# Patient Record
Sex: FEMALE | Race: WHITE | NOT HISPANIC OR LATINO | URBAN - METROPOLITAN AREA
[De-identification: names, ages, dates, MRNs, and addresses within clinical notes are randomized per-mention and may not be internally consistent; named-entity substitution may affect disease eponyms.]

---

## 2020-04-06 ENCOUNTER — TELEPHONE (OUTPATIENT)
Dept: FAMILY MEDICINE CLINIC | Facility: CLINIC | Age: 30
End: 2020-04-06

## 2020-04-06 ENCOUNTER — TELEMEDICINE (OUTPATIENT)
Dept: FAMILY MEDICINE CLINIC | Facility: CLINIC | Age: 30
End: 2020-04-06

## 2020-04-06 DIAGNOSIS — J45.41 MODERATE PERSISTENT ASTHMA WITH ACUTE EXACERBATION: Primary | ICD-10-CM

## 2020-04-06 DIAGNOSIS — J45.41 MODERATE PERSISTENT ASTHMA WITH ACUTE EXACERBATION: ICD-10-CM

## 2020-04-06 PROCEDURE — 99202 OFFICE O/P NEW SF 15 MIN: CPT | Performed by: NURSE PRACTITIONER

## 2020-04-06 RX ORDER — ALBUTEROL SULFATE 90 UG/1
2 AEROSOL, METERED RESPIRATORY (INHALATION) 4 TIMES DAILY PRN
COMMUNITY
Start: 2020-04-01 | End: 2020-04-06 | Stop reason: SDUPTHER

## 2020-04-06 RX ORDER — BUDESONIDE AND FORMOTEROL FUMARATE DIHYDRATE 160; 4.5 UG/1; UG/1
2 AEROSOL RESPIRATORY (INHALATION)
Qty: 1 INHALER | Refills: 12 | Status: SHIPPED | OUTPATIENT
Start: 2020-04-06 | End: 2020-04-06 | Stop reason: SDUPTHER

## 2020-04-06 RX ORDER — ALBUTEROL SULFATE 2.5 MG/3ML
2.5 SOLUTION RESPIRATORY (INHALATION) 4 TIMES DAILY PRN
COMMUNITY
Start: 2020-04-01 | End: 2021-12-23 | Stop reason: SDUPTHER

## 2020-04-06 RX ORDER — PREDNISONE 10 MG/1
10 TABLET ORAL 2 TIMES DAILY
COMMUNITY
Start: 2020-04-01 | End: 2020-04-06

## 2020-04-06 RX ORDER — ALBUTEROL SULFATE 90 UG/1
2 AEROSOL, METERED RESPIRATORY (INHALATION) 4 TIMES DAILY PRN
Qty: 1 INHALER | Refills: 11 | Status: SHIPPED | OUTPATIENT
Start: 2020-04-06 | End: 2022-01-31 | Stop reason: SDUPTHER

## 2020-04-06 RX ORDER — ALBUTEROL SULFATE 90 UG/1
2 AEROSOL, METERED RESPIRATORY (INHALATION) 4 TIMES DAILY PRN
Qty: 1 INHALER | Refills: 11 | Status: SHIPPED | OUTPATIENT
Start: 2020-04-06 | End: 2020-04-06 | Stop reason: SDUPTHER

## 2020-04-06 RX ORDER — BUDESONIDE AND FORMOTEROL FUMARATE DIHYDRATE 160; 4.5 UG/1; UG/1
2 AEROSOL RESPIRATORY (INHALATION)
Qty: 1 INHALER | Refills: 12 | Status: SHIPPED | OUTPATIENT
Start: 2020-04-06 | End: 2021-03-28

## 2020-04-06 RX ORDER — CETIRIZINE HYDROCHLORIDE 5 MG/1
5 TABLET ORAL DAILY
COMMUNITY

## 2020-04-06 NOTE — PROGRESS NOTES
"Subjective   Jeanine Monteiro is a 29 y.o. female. She is new patient and new to me.     History of Present Illness   Pt presents today with c/o asthma, chronic, persistent, uncontrolled. Pt reports that she went to an immediate care center recently due to cough and shortness of breath. She states that she was negative for COVID-19. Pt was treated with prednisone and albuterol inhaler and nebulizer. Pt reports that she \"wakes up coughing and wheezing every morning\". She states that she is using her albuterol \"at least 3 times a day\" due to wheezing and shortness of breath. Pt reports that she moved into a new apartment and that she had to have the carpet replaced, which \"has helped some\" with her breathing.  Pt states that she has no job right now due to being \"laid off\". She is a  by [employer] She is taking Zyrtec and Flonase over the counter, in addition to her albuterol.  I discussed at length maintenance inhalers and potential adverse side affects.  Pt is in agreement with starting this, however, is concerned with the potential cost of the medication.     The following portions of the patient's history were reviewed and updated as appropriate: allergies, current medications, past family history, past medical history, past social history, past surgical history and problem list.    Review of Systems   Constitutional: Positive for unexpected weight gain. Negative for appetite change, chills, fatigue and fever.   HENT: Positive for rhinorrhea. Negative for congestion, ear pain, postnasal drip, sinus pressure, sneezing and sore throat.    Eyes: Positive for itching. Negative for redness.   Respiratory: Positive for cough, shortness of breath and wheezing. Negative for chest tightness.    Cardiovascular: Negative for chest pain, palpitations and leg swelling.   Musculoskeletal: Negative for myalgias.   Allergic/Immunologic: Negative.    Neurological: Positive for headache. Negative for dizziness.       Objective "   Physical Exam     This vist was completed via televisit due to the restrictions of the COVID-19 pandemic. All issues as below were discussed and addressed. If it was felt that the patient should be evaluated in clinic, then they were directed there.  The patient verbally consented to visit.     There were no vitals filed for this visit.  There is no height or weight on file to calculate BMI.    Procedures    Assessment/Plan   Problems Addressed this Visit     None      Visit Diagnoses     Moderate persistent asthma with acute exacerbation    -  Primary    Relevant Medications    albuterol (PROVENTIL) (2.5 MG/3ML) 0.083% nebulizer solution        Return in about 4 weeks (around 5/4/2020) for Recheck, Labs.              I have sent you a prescription for Symbicort 2 puffs, twice daily. This is a maintenance medication as discussed and does not take the place of your albuterol (emergency) inhaler.  I would like to see you in 1 month to recheck how you are doing with the new medication.  Call with any questions or concerns.   I encourage you to sign up for [employer] as you can contact me directly.     Asthma, Adult    Asthma is a long-term (chronic) condition that causes recurrent episodes in which the airways become tight and narrow. The airways are the passages that lead from the nose and mouth down into the lungs. Asthma episodes, also called asthma attacks, can cause coughing, wheezing, shortness of breath, and chest pain. The airways can also fill with mucus. During an attack, it can be difficult to breathe. Asthma attacks can range from minor to life threatening.  Asthma cannot be cured, but medicines and lifestyle changes can help control it and treat acute attacks.  What are the causes?  This condition is believed to be caused by inherited (genetic) and environmental factors, but its exact cause is not known.  There are many things that can bring on an asthma attack or make asthma symptoms worse (triggers).  Asthma triggers are different for each person. Common triggers include:  · Mold.  · Dust.  · Cigarette smoke.  · Cockroaches.  · Things that can cause allergy symptoms (allergens), such as animal dander or pollen from trees or grass.  · Air pollutants such as household , wood smoke, smog, or chemical odors.  · Cold air, weather changes, and winds (which increase molds and pollen in the air).  · Strong emotional expressions such as crying or laughing hard.  · Stress.  · Certain medicines (such as aspirin) or types of medicines (such as beta-blockers).  · Sulfites in foods and drinks. Foods and drinks that may contain sulfites include dried fruit, potato chips, and sparkling grape juice.  · Infections or inflammatory conditions such as the flu, a cold, or inflammation of the nasal membranes (rhinitis).  · Gastroesophageal reflux disease (GERD).  · Exercise or strenuous activity.  What are the signs or symptoms?  Symptoms of this condition may occur right after asthma is triggered or many hours later. Symptoms include:  · Wheezing. This can sound like whistling when you breathe.  · Excessive nighttime or early morning coughing.  · Frequent or severe coughing with a common cold.  · Chest tightness.  · Shortness of breath.  · Tiredness (fatigue) with minimal activity.  How is this diagnosed?  This condition is diagnosed based on:  · Your medical history.  · A physical exam.  · Tests, which may include:  ? Lung function studies and pulmonary studies (spirometry). These tests can evaluate the flow of air in your lungs.  ? Allergy tests.  ? Imaging tests, such as X-rays.  How is this treated?  There is no cure for this condition, but treatment can help control your symptoms. Treatment for asthma usually involves:  · Identifying and avoiding your asthma triggers.  · Using medicines to control your symptoms. Generally, two types of medicines are used to treat asthma:  ? Controller medicines. These help prevent asthma  symptoms from occurring. They are usually taken every day.  ? Fast-acting reliever or rescue medicines. These quickly relieve asthma symptoms by widening the narrow and tight airways. They are used as needed and provide short-term relief.  · Using supplemental oxygen. This may be needed during a severe episode.  · Using other medicines, such as:  ? Allergy medicines, such as antihistamines, if your asthma attacks are triggered by allergens.  ? Immune medicines (immunomodulators). These are medicines that help control the immune system.  · Creating an asthma action plan. An asthma action plan is a written plan for managing and treating your asthma attacks. This plan includes:  ? A list of your asthma triggers and how to avoid them.  ? Information about when medicines should be taken and when their dosage should be changed.  ? Instructions about using a device called a peak flow meter. A peak flow meter measures how well the lungs are working and the severity of your asthma. It helps you monitor your condition.  Follow these instructions at home:  Controlling your home environment  Control your home environment in the following ways to help avoid triggers and prevent asthma attacks:  · Change your heating and air conditioning filter regularly.  · Limit your use of fireplaces and wood stoves.  · Get rid of pests (such as roaches and mice) and their droppings.  · Throw away plants if you see mold on them.  · Clean floors and dust surfaces regularly. Use unscented cleaning products.  · Try to have someone else vacuum for you regularly. Stay out of rooms while they are being vacuumed and for a short while afterward. If you vacuum, use a dust mask from a hardware store, a double-layered or microfilter vacuum  bag, or a vacuum  with a HEPA filter.  · Replace carpet with wood, tile, or vinyl jackelin. Carpet can trap dander and dust.  · Use allergy-proof pillows, mattress covers, and box spring covers.  · Keep  your bedroom a trigger-free room.  · Avoid pets and keep windows closed when allergens are in the air.  · Wash beddings every week in hot water and dry them in a dryer.  · Use blankets that are made of polyester or cotton.  · Clean bathrooms and cris with bleach. If possible, have someone repaint the walls in these rooms with mold-resistant paint. Stay out of the rooms that are being cleaned and painted.  · Wash your hands often with soap and water. If soap and water are not available, use hand .  · Do not allow anyone to smoke in your home.  General instructions  · Take over-the-counter and prescription medicines only as told by your health care provider.  ? Speak with your health care provider if you have questions about how or when to take the medicines.  ? Make note if you are requiring more frequent dosages.  · Do not use any products that contain nicotine or tobacco, such as cigarettes and e-cigarettes. If you need help quitting, ask your health care provider. Also, avoid being exposed to secondhand smoke.  · Use a peak flow meter as told by your health care provider. Record and keep track of the readings.  · Understand and use the asthma action plan to help minimize, or stop an asthma attack, without needing to seek medical care.  · Make sure you stay up to date on your yearly vaccinations as told by your health care provider. This may include vaccines for the flu and pneumonia.  · Avoid outdoor activities when allergen counts are high and when air quality is low.  · Wear a ski mask that covers your nose and mouth during outdoor winter activities. Exercise indoors on cold days if you can.  · Warm up before exercising, and take time for a cool-down period after exercise.  · Keep all follow-up visits as told by your health care provider. This is important.  Where to find more information  · For information about asthma, turn to the Centers for Disease Control and Prevention at  www.cdc.gov/asthma/faqs.htm  · For air quality information, turn to [employer] at https://airnow.gov/  Contact a health care provider if:  · You have wheezing, shortness of breath, or a cough even while you are taking medicine to prevent attacks.  · The mucus you cough up (sputum) is thicker than usual.  · Your sputum changes from clear or white to yellow, green, gray, or bloody.  · Your medicines are causing side effects, such as a rash, itching, swelling, or trouble breathing.  · You need to use a reliever medicine more than 2-3 times a week.  · Your peak flow reading is still at 50-79% of your personal best after following your action plan for 1 hour.  · You have a fever.  Get help right away if:  · You are getting worse and do not respond to treatment during an asthma attack.  · You are short of breath when at rest or when doing very little physical activity.  · You have difficulty eating, drinking, or talking.  · You have chest pain or tightness.  · You develop a fast heartbeat or palpitations.  · You have a bluish color to your lips or fingernails.  · You are light-headed or dizzy, or you faint.  · Your peak flow reading is less than 50% of your personal best.  · You feel too tired to breathe normally.  Summary  · Asthma is a long-term (chronic) condition that causes recurrent episodes in which the airways become tight and narrow. These episodes can cause coughing, wheezing, shortness of breath, and chest pain.  · Asthma cannot be cured, but medicines and lifestyle changes can help control it and treat acute attacks.  · Make sure you understand how to avoid triggers and how and when to use your medicines.  · Asthma attacks can range from minor to life threatening. Get help right away if you have an asthma attack and do not respond to treatment with your usual rescue medicines.  This information is not intended to replace advice given to you by your health care provider. Make sure you discuss any questions you have  with your health care provider.  Document Released: 12/18/2006 Document Revised: 01/22/2018 Document Reviewed: 01/22/2018  Elsevier Interactive Patient Education © 2020 Elsevier Inc.

## 2020-04-06 NOTE — TELEPHONE ENCOUNTER
PATIENT STATES THAT SHE WOULD LIKE TO CHANGE HER PHARMACY TO [id number] - [place], [place] - [address] AT [facility] [address] &  - [phone]  - [phone] FX. SHE STATES THAT SHE TOLD HER DOCTOR TO SEND HER MEDS TO [facility] BUT SHE NO LONGER WANTS THAT TO HAPPEN.     PATIENT CAN BE REACHED AT:[phone]    LOV-4/6/2020

## 2020-04-06 NOTE — PATIENT INSTRUCTIONS
I have sent you a prescription for Symbicort 2 puffs, twice daily. This is a maintenance medication as discussed and does not take the place of your albuterol (emergency) inhaler.  I would like to see you in 1 month to recheck how you are doing with the new medication.  Call with any questions or concerns.   I encourage you to sign up for [employer] as you can contact me directly.     Asthma, Adult    Asthma is a long-term (chronic) condition that causes recurrent episodes in which the airways become tight and narrow. The airways are the passages that lead from the nose and mouth down into the lungs. Asthma episodes, also called asthma attacks, can cause coughing, wheezing, shortness of breath, and chest pain. The airways can also fill with mucus. During an attack, it can be difficult to breathe. Asthma attacks can range from minor to life threatening.  Asthma cannot be cured, but medicines and lifestyle changes can help control it and treat acute attacks.  What are the causes?  This condition is believed to be caused by inherited (genetic) and environmental factors, but its exact cause is not known.  There are many things that can bring on an asthma attack or make asthma symptoms worse (triggers). Asthma triggers are different for each person. Common triggers include:  · Mold.  · Dust.  · Cigarette smoke.  · Cockroaches.  · Things that can cause allergy symptoms (allergens), such as animal dander or pollen from trees or grass.  · Air pollutants such as household , wood smoke, smog, or chemical odors.  · Cold air, weather changes, and winds (which increase molds and pollen in the air).  · Strong emotional expressions such as crying or laughing hard.  · Stress.  · Certain medicines (such as aspirin) or types of medicines (such as beta-blockers).  · Sulfites in foods and drinks. Foods and drinks that may contain sulfites include dried fruit, potato chips, and sparkling grape juice.  · Infections or inflammatory  conditions such as the flu, a cold, or inflammation of the nasal membranes (rhinitis).  · Gastroesophageal reflux disease (GERD).  · Exercise or strenuous activity.  What are the signs or symptoms?  Symptoms of this condition may occur right after asthma is triggered or many hours later. Symptoms include:  · Wheezing. This can sound like whistling when you breathe.  · Excessive nighttime or early morning coughing.  · Frequent or severe coughing with a common cold.  · Chest tightness.  · Shortness of breath.  · Tiredness (fatigue) with minimal activity.  How is this diagnosed?  This condition is diagnosed based on:  · Your medical history.  · A physical exam.  · Tests, which may include:  ? Lung function studies and pulmonary studies (spirometry). These tests can evaluate the flow of air in your lungs.  ? Allergy tests.  ? Imaging tests, such as X-rays.  How is this treated?  There is no cure for this condition, but treatment can help control your symptoms. Treatment for asthma usually involves:  · Identifying and avoiding your asthma triggers.  · Using medicines to control your symptoms. Generally, two types of medicines are used to treat asthma:  ? Controller medicines. These help prevent asthma symptoms from occurring. They are usually taken every day.  ? Fast-acting reliever or rescue medicines. These quickly relieve asthma symptoms by widening the narrow and tight airways. They are used as needed and provide short-term relief.  · Using supplemental oxygen. This may be needed during a severe episode.  · Using other medicines, such as:  ? Allergy medicines, such as antihistamines, if your asthma attacks are triggered by allergens.  ? Immune medicines (immunomodulators). These are medicines that help control the immune system.  · Creating an asthma action plan. An asthma action plan is a written plan for managing and treating your asthma attacks. This plan includes:  ? A list of your asthma triggers and how to  avoid them.  ? Information about when medicines should be taken and when their dosage should be changed.  ? Instructions about using a device called a peak flow meter. A peak flow meter measures how well the lungs are working and the severity of your asthma. It helps you monitor your condition.  Follow these instructions at home:  Controlling your home environment  Control your home environment in the following ways to help avoid triggers and prevent asthma attacks:  · Change your heating and air conditioning filter regularly.  · Limit your use of fireplaces and wood stoves.  · Get rid of pests (such as roaches and mice) and their droppings.  · Throw away plants if you see mold on them.  · Clean floors and dust surfaces regularly. Use unscented cleaning products.  · Try to have someone else vacuum for you regularly. Stay out of rooms while they are being vacuumed and for a short while afterward. If you vacuum, use a dust mask from a [employer] store, a double-layered or microfilter vacuum  bag, or a vacuum  with a HEPA filter.  · Replace carpet with wood, tile, or vinyl jackelin. Carpet can trap dander and dust.  · Use allergy-proof pillows, mattress covers, and box spring covers.  · Keep your bedroom a trigger-free room.  · Avoid pets and keep windows closed when allergens are in the air.  · Wash beddings every week in hot water and dry them in a dryer.  · Use blankets that are made of polyester or cotton.  · Clean bathrooms and cris with bleach. If possible, have someone repaint the walls in these rooms with mold-resistant paint. Stay out of the rooms that are being cleaned and painted.  · Wash your hands often with soap and water. If soap and water are not available, use hand .  · Do not allow anyone to smoke in your home.  General instructions  · Take over-the-counter and prescription medicines only as told by your health care provider.  ? Speak with your health care provider if you have  questions about how or when to take the medicines.  ? Make note if you are requiring more frequent dosages.  · Do not use any products that contain nicotine or tobacco, such as cigarettes and e-cigarettes. If you need help quitting, ask your health care provider. Also, avoid being exposed to secondhand smoke.  · Use a peak flow meter as told by your health care provider. Record and keep track of the readings.  · Understand and use the asthma action plan to help minimize, or stop an asthma attack, without needing to seek medical care.  · Make sure you stay up to date on your yearly vaccinations as told by your health care provider. This may include vaccines for the flu and pneumonia.  · Avoid outdoor activities when allergen counts are high and when air quality is low.  · Wear a ski mask that covers your nose and mouth during outdoor winter activities. Exercise indoors on cold days if you can.  · Warm up before exercising, and take time for a cool-down period after exercise.  · Keep all follow-up visits as told by your health care provider. This is important.  Where to find more information  · For information about asthma, turn to the Centers for Disease Control and Prevention at www.cdc.gov/asthma/faqs.htm  · For air quality information, turn to [employer] at https://airnow.gov/  Contact a health care provider if:  · You have wheezing, shortness of breath, or a cough even while you are taking medicine to prevent attacks.  · The mucus you cough up (sputum) is thicker than usual.  · Your sputum changes from clear or white to yellow, green, gray, or bloody.  · Your medicines are causing side effects, such as a rash, itching, swelling, or trouble breathing.  · You need to use a reliever medicine more than 2-3 times a week.  · Your peak flow reading is still at 50-79% of your personal best after following your action plan for 1 hour.  · You have a fever.  Get help right away if:  · You are getting worse and do not respond to  treatment during an asthma attack.  · You are short of breath when at rest or when doing very little physical activity.  · You have difficulty eating, drinking, or talking.  · You have chest pain or tightness.  · You develop a fast heartbeat or palpitations.  · You have a bluish color to your lips or fingernails.  · You are light-headed or dizzy, or you faint.  · Your peak flow reading is less than 50% of your personal best.  · You feel too tired to breathe normally.  Summary  · Asthma is a long-term (chronic) condition that causes recurrent episodes in which the airways become tight and narrow. These episodes can cause coughing, wheezing, shortness of breath, and chest pain.  · Asthma cannot be cured, but medicines and lifestyle changes can help control it and treat acute attacks.  · Make sure you understand how to avoid triggers and how and when to use your medicines.  · Asthma attacks can range from minor to life threatening. Get help right away if you have an asthma attack and do not respond to treatment with your usual rescue medicines.  This information is not intended to replace advice given to you by your health care provider. Make sure you discuss any questions you have with your health care provider.  Document Released: 12/18/2006 Document Revised: 01/22/2018 Document Reviewed: 01/22/2018  [employer] Interactive Patient Education © 2020 Elsevier Inc.

## 2020-04-16 ENCOUNTER — TELEPHONE (OUTPATIENT)
Dept: FAMILY MEDICINE CLINIC | Facility: CLINIC | Age: 30
End: 2020-04-16

## 2020-04-16 NOTE — TELEPHONE ENCOUNTER
PT STATED THAT HER INSURANCE WILL NOT PAY FOR THE MEDICATION budesonide-formoterol (Symbicort) 160-4.5 MCG/ACT inhaler WITHOUT TO[employer]O FIGU[employer] TALKING TO THEM. PT REQUESTED TO GET A PHONE CALL ON THIS MATTER.

## 2020-04-17 DIAGNOSIS — J45.41 MODERATE PERSISTENT ASTHMA WITH ACUTE EXACERBATION: ICD-10-CM

## 2020-04-17 RX ORDER — BUDESONIDE AND FORMOTEROL FUMARATE DIHYDRATE 160; 4.5 UG/1; UG/1
AEROSOL RESPIRATORY (INHALATION)
Qty: 10.2 G | OUTPATIENT
Start: 2020-04-17

## 2021-02-01 ENCOUNTER — TELEPHONE (OUTPATIENT)
Dept: FAMILY MEDICINE CLINIC | Facility: CLINIC | Age: 31
End: 2021-02-01

## 2021-02-01 ENCOUNTER — TELEPHONE (OUTPATIENT)
Dept: INTERNAL MEDICINE | Facility: CLINIC | Age: 31
End: 2021-02-01

## 2021-02-01 RX ORDER — BUSPIRONE HYDROCHLORIDE 15 MG/1
15 TABLET ORAL 2 TIMES DAILY
Qty: 180 TABLET | Refills: 1 | Status: SHIPPED | OUTPATIENT
Start: 2021-02-01 | End: 2022-01-31 | Stop reason: SDUPTHER

## 2021-02-01 NOTE — TELEPHONE ENCOUNTER
Caller: Jeanine Monteiro    Relationship to patient: Self    Best call back number: [phone]     Patient is needing: Patient called in requesting a refill on the buspirone (did not see on medication list). Was unsure if patient needed an appointment to have medication refilled or not. Please advise.

## 2021-08-04 ENCOUNTER — OFFICE VISIT (OUTPATIENT)
Dept: INTERNAL MEDICINE | Facility: CLINIC | Age: 31
End: 2021-08-04

## 2021-08-04 VITALS
BODY MASS INDEX: 35.88 KG/M2 | HEART RATE: 100 BPM | OXYGEN SATURATION: 98 % | WEIGHT: 195 LBS | RESPIRATION RATE: 18 BRPM | TEMPERATURE: 98 F | DIASTOLIC BLOOD PRESSURE: 76 MMHG | HEIGHT: 62 IN | SYSTOLIC BLOOD PRESSURE: 124 MMHG

## 2021-08-04 DIAGNOSIS — N64.4 BREAST PAIN: ICD-10-CM

## 2021-08-04 DIAGNOSIS — R07.9 CHEST PAIN, UNSPECIFIED TYPE: Primary | ICD-10-CM

## 2021-08-04 PROCEDURE — 99214 OFFICE O/P EST MOD 30 MIN: CPT | Performed by: INTERNAL MEDICINE

## 2021-08-04 PROCEDURE — 93005 ELECTROCARDIOGRAM TRACING: CPT | Performed by: INTERNAL MEDICINE

## 2021-08-04 RX ORDER — MONTELUKAST SODIUM 10 MG/1
TABLET ORAL
COMMUNITY
Start: 2021-07-27 | End: 2021-10-25

## 2021-08-04 RX ORDER — MELOXICAM 15 MG/1
15 TABLET ORAL DAILY
Qty: 30 TABLET | Refills: 2 | Status: SHIPPED | OUTPATIENT
Start: 2021-08-04 | End: 2021-10-25

## 2021-08-04 NOTE — PROGRESS NOTES
"Chief Complaint  Breast Mass (left )    Subjective          Jeanine Monteiro presents to [facility]  Here with left breast pain for last 6 weeks or so, no specific injury, left upper breast, worse with lifting and carrying items, does a lot of lifting over her head with trays at work; no redness, has felt it being warm in the past; has noted that his pain is worse with menstrual cycles; did recently try to have an in home insemination prior to this starting, did not take any medications; does note that the left breast has always been larger than the right      Objective   Vital Signs:   /76   Pulse 100   Temp 98 °F (36.7 °C)   Resp 18   Ht 157.5 cm (62\")   Wt 88.5 kg (195 lb)   SpO2 98%   BMI 35.67 kg/m²     Physical Exam  Vitals and nursing note reviewed.   Constitutional:       Appearance: Normal appearance.   HENT:      Head: Normocephalic and atraumatic.      Right Ear: External ear normal.      Left Ear: External ear normal.      Nose: Nose normal.      Mouth/Throat:      Mouth: Mucous membranes are moist.      Pharynx: Oropharynx is clear.   Eyes:      Extraocular Movements: Extraocular movements intact.      Conjunctiva/sclera: Conjunctivae normal.   Pulmonary:      Effort: Pulmonary effort is normal. No respiratory distress.   Musculoskeletal:         General: Normal range of motion.      Cervical back: Normal range of motion.      Comments: Breast and axillary exams normal bilaterally, supervised by richard paige, left upper pectoral muscle ttp   Skin:     Findings: No rash.   Neurological:      General: No focal deficit present.      Mental Status: She is alert. Mental status is at baseline.   Psychiatric:         Mood and Affect: Mood normal.         Behavior: Behavior normal.         Thought Content: Thought content normal.         Judgment: Judgment normal.        Result Review :            ECG 12 Lead    Date/Time: 11/2/2021 2:56 " PM  Performed by: Arturo Mullins MD  Authorized by: Arturo Mullins MD   Comparison: not compared with previous ECG   Previous ECG: no previous ECG available  Rhythm: sinus rhythm  Rate: normal  Conduction: conduction normal  ST Segments: ST segments normal  T Waves: T waves normal  QRS axis: normal    Clinical impression: normal ECG              Assessment and Plan    Diagnoses and all orders for this visit:    1. Chest pain, unspecified type (Primary)  -     ECG 12 Lead    2. Breast pain  -     Mammo Screening Bilateral With CAD; Future  -     POCT pregnancy, urine    Other orders  -     meloxicam (MOBIC) 15 MG tablet; Take 1 tablet by mouth Daily.  Dispense: 30 tablet; Refill: 2    - suspect her breast/chest wall pain is 2/2 suspensory ligament pain vs muscular strain; the pain seems to be worse with breast size changing with menstrual cycles; start nsaids, pt referral given today to help with strengthening, stretching exercises  - mammogram placed today, feel less likely mass to be a concern, none on exam  - rtc 1 month    Follow Up   No follow-ups on file.  Patient was given instructions and counseling regarding her condition or for health maintenance advice. Please see specific information pulled into the AVS if appropriate.

## 2021-09-27 NOTE — TELEPHONE ENCOUNTER
Hub staff attempted to follow warm transfer process and was unsuccessful     Caller: Jeanine Monteiro    Relationship to patient: Self    Best call back number: [phone] (H)    Patient is needing: PATIENT CALLING TO CHECK THE STATUS OF MEDICATION REFILL PATIENT STATES COMPLETELY OUT      Breo Ellipta 100-25 MCG/INH inhaler      [facility] #[id number] [facility] [address] AT [facility] - [phone] [facility] [phone]   [phone]

## 2021-09-27 NOTE — TELEPHONE ENCOUNTER
Rx Refill Note  Requested Prescriptions     Pending Prescriptions Disp Refills   • Breo Ellipta 100-25 MCG/INH inhaler [Pharmacy Med Name: BREO ELLIPTA 100-25MCG ORAL INH(30)] 180 each      Sig: INHALE 1 PUFF BY MOUTH DAILY      Last office visit with prescribing clinician: Visit date not found      Next office visit with prescribing clinician: Visit date not found            Shirley Law MA  09/27/21, 14:55 EDT

## 2021-09-29 ENCOUNTER — TELEPHONE (OUTPATIENT)
Dept: INTERNAL MEDICINE | Facility: CLINIC | Age: 31
End: 2021-09-29

## 2021-09-29 NOTE — TELEPHONE ENCOUNTER
Caller: Jeanine Monteiro    Relationship: Self    Best call back number: [phone]    What orders are you requesting (i.e. lab or imaging): ORDERS FOR MAMMOGRAM FOR PAIN IN HER LEFT BREAST      In what timeframe would the patient need to come in: ASAP     Where will you receive your lab/imaging services:  [id number]  [facility]  - [facility]     Additional notes: PATIENT STATED SHE WAS SCHEDULED FOR A MAMOGRAM AND THEY CANCELLED IT DUE TO HER ORDER NOT SAYING PAIN IN HER BREAST PATIENT WAS ONLY SCHEDULED SCREENING . PATIENT  NEEDS AN ORDER SAYING MAMMOGRAM NEEDED FOR LEFT BREAST PAIN .

## 2021-10-05 ENCOUNTER — TELEPHONE (OUTPATIENT)
Dept: INTERNAL MEDICINE | Facility: CLINIC | Age: 31
End: 2021-10-05

## 2021-10-05 DIAGNOSIS — R07.9 CHEST PAIN, UNSPECIFIED TYPE: Primary | ICD-10-CM

## 2021-10-05 DIAGNOSIS — N64.4 BREAST PAIN: ICD-10-CM

## 2021-10-05 DIAGNOSIS — N63.10 BREAST MASS, RIGHT: ICD-10-CM

## 2021-10-05 NOTE — TELEPHONE ENCOUNTER
MICHAEL WITH [facility] ([phone]) CALLED AND SAID THAT [facility]'S MAMMOGRAM WAS CANCELED DUE TO THE ORDER STATING THAT IT'S A MAMMOGRAM SCREENING.  THE ORDER NEEDS TO BE FOR A DIAGNOSTIC MAMMOGRAM DUE TO THE PATIENT HAVING BREAST PAIN.  COULD YOU PLEASE PUT IN AN ORDER FOR THAT?    THANK YOU

## 2021-10-06 ENCOUNTER — HOSPITAL ENCOUNTER (OUTPATIENT)
Dept: MAMMOGRAPHY | Facility: HOSPITAL | Age: 31
End: 2021-10-06

## 2021-10-25 RX ORDER — MONTELUKAST SODIUM 10 MG/1
TABLET ORAL
Qty: 30 TABLET | Refills: 3 | Status: SHIPPED | OUTPATIENT
Start: 2021-10-25 | End: 2022-01-31 | Stop reason: SDUPTHER

## 2021-10-25 RX ORDER — MELOXICAM 15 MG/1
15 TABLET ORAL DAILY
Qty: 30 TABLET | Refills: 2 | Status: SHIPPED | OUTPATIENT
Start: 2021-10-25

## 2021-10-25 NOTE — TELEPHONE ENCOUNTER
Rx Refill Note  Requested Prescriptions     Pending Prescriptions Disp Refills   • meloxicam (MOBIC) 15 MG tablet [Pharmacy Med Name: MELOXICAM 15MG TABLETS] 30 tablet 2     Sig: TAKE 1 TABLET BY MOUTH DAILY      Last office visit with prescribing clinician: 8/4/2021      Next office visit with prescribing clinician: Visit date not found            Feliberto Ortiz MA  10/25/21, 07:55 EDT

## 2021-10-25 NOTE — TELEPHONE ENCOUNTER
Rx Refill Note  Requested Prescriptions     Pending Prescriptions Disp Refills   • montelukast (SINGULAIR) 10 MG tablet [Pharmacy Med Name: MONTELUKAST 10MG TABLETS] 30 tablet      Sig: TAKE 1 TABLET BY MOUTH EVERY NIGHT      Last office visit with prescribing clinician: Visit date not found      Next office visit with prescribing clinician: Visit date not found            Shirley Law MA  10/25/21, 07:57 EDT

## 2021-11-02 ENCOUNTER — HOSPITAL ENCOUNTER (OUTPATIENT)
Dept: MAMMOGRAPHY | Facility: HOSPITAL | Age: 31
Discharge: HOME | End: 2021-11-02

## 2021-11-02 ENCOUNTER — HOSPITAL ENCOUNTER (OUTPATIENT)
Dept: ULTRASOUND IMAGING | Facility: HOSPITAL | Age: 31
Discharge: HOME | End: 2021-11-02

## 2021-11-02 DIAGNOSIS — R07.9 CHEST PAIN, UNSPECIFIED TYPE: ICD-10-CM

## 2021-11-02 DIAGNOSIS — N64.4 BREAST PAIN: ICD-10-CM

## 2021-11-02 PROCEDURE — 93000 ELECTROCARDIOGRAM COMPLETE: CPT | Performed by: INTERNAL MEDICINE

## 2021-11-02 PROCEDURE — 76641 ULTRASOUND BREAST COMPLETE: CPT

## 2021-11-02 PROCEDURE — G0279 TOMOSYNTHESIS, MAMMO: HCPCS

## 2021-11-02 PROCEDURE — 77066 DX MAMMO INCL CAD BI: CPT

## 2021-11-03 DIAGNOSIS — N63.10 BREAST MASS, RIGHT: Primary | ICD-10-CM

## 2021-11-05 RX ORDER — MONTELUKAST SODIUM 10 MG/1
10 TABLET ORAL NIGHTLY
Qty: 30 TABLET | Refills: 3 | Status: CANCELLED | OUTPATIENT
Start: 2021-11-05

## 2021-11-05 NOTE — TELEPHONE ENCOUNTER
Caller: Jeanine Monteiro    Relationship: Self    Requested Prescriptions:   Requested Prescriptions     Pending Prescriptions Disp Refills   • montelukast (SINGULAIR) 10 MG tablet 30 tablet 3     Sig: Take 1 tablet by mouth Every Night.        Pharmacy where request should be sent: [facility] #[id number] [place], [place] - [address] AT [facility] &  - [phone]  - [phone] FX    Additional details provided by patient: THE PATIENT STATES THAT SHE IS CURRENTLY OUT OF THIS MEDICATION.    Best call back number: [phone]    Does the patient have less than a 3 day supply:  [x] Yes  [] No    James De La Torre Rep   11/05/21 10:35 EDT

## 2021-11-09 ENCOUNTER — HOSPITAL ENCOUNTER (OUTPATIENT)
Dept: ULTRASOUND IMAGING | Facility: HOSPITAL | Age: 31
Discharge: HOME | End: 2021-11-09
Admitting: INTERNAL MEDICINE

## 2021-11-09 VITALS
HEART RATE: 82 BPM | TEMPERATURE: 98.4 F | BODY MASS INDEX: 35.88 KG/M2 | RESPIRATION RATE: 16 BRPM | WEIGHT: 195 LBS | SYSTOLIC BLOOD PRESSURE: 115 MMHG | DIASTOLIC BLOOD PRESSURE: 83 MMHG | HEIGHT: 62 IN

## 2021-11-09 DIAGNOSIS — N63.10 BREAST MASS, RIGHT: ICD-10-CM

## 2021-11-09 PROCEDURE — A4648 IMPLANTABLE TISSUE MARKER: HCPCS

## 2021-11-09 PROCEDURE — 88305 TISSUE EXAM BY PATHOLOGIST: CPT | Performed by: INTERNAL MEDICINE

## 2021-11-09 RX ORDER — LIDOCAINE HYDROCHLORIDE 10 MG/ML
10 INJECTION, SOLUTION INFILTRATION; PERINEURAL ONCE
Status: DISCONTINUED | OUTPATIENT
Start: 2021-11-09 | End: 2021-11-10 | Stop reason: HOSPADM

## 2021-11-09 RX ORDER — LIDOCAINE HYDROCHLORIDE AND EPINEPHRINE 10; 10 MG/ML; UG/ML
10 INJECTION, SOLUTION INFILTRATION; PERINEURAL ONCE
Status: DISCONTINUED | OUTPATIENT
Start: 2021-11-09 | End: 2021-11-10 | Stop reason: HOSPADM

## 2021-11-09 RX ORDER — DIAZEPAM 5 MG/1
5 TABLET ORAL ONCE AS NEEDED
Status: DISCONTINUED | OUTPATIENT
Start: 2021-11-09 | End: 2021-11-10 | Stop reason: HOSPADM

## 2021-11-09 NOTE — NURSING NOTE
Biopsy site to right breast, clear with Skin Exofin dry and intact. No firmness or swelling noted at or around biopsy site. Denies pain. Ice pack with protective covering applied to biopsy site. Discharge instructions discussed with understanding voiced by patient. Copies of discharge instructions provided to patient. No distress noted. To home via private vehicle.

## 2021-11-09 NOTE — H&P
Name: Jeanine Monteiro ADMIT: 2021   : 1990  PCP: Cookie Fay MD    MRN: [id number] LOS: 0 days   AGE/SEX: 31 y.o. female  ROOM: Room/bed info not found       Chief complaint right breast mass    Present Illness or Internal History:  Patient is a 31 y.o. female presents with a right breast mass.     Past Surgical History:  Past Surgical History:   Procedure Laterality Date   •  SECTION     • TONSILLECTOMY         Past Medical History:  Past Medical History:   Diagnosis Date   • Allergic    • Asthma    • Diverticulitis        Home Medications:  (Not in a hospital admission)      Allergies:  Penicillins and Latex    Family History:  Family History   Problem Relation Age of Onset   • Asthma Mother    • Diverticulitis Mother    • Hypertension Paternal Grandmother    • Breast cancer Paternal Aunt        Social History:  Social History     Tobacco Use   • Smoking status: Former Smoker     Quit date: 2018     Years since quitting: 3.5   • Smokeless tobacco: Never Used   Substance Use Topics   • Alcohol use: Yes     Alcohol/week: 6.0 standard drinks     Types: 6 Glasses of wine per week   • Drug use: Never        Objective     Physical Exam:    No exam performed today,    Vital Signs  Temp:  [98.4 °F (36.9 °C)] 98.4 °F (36.9 °C)  Heart Rate:  [101] 101  Resp:  [16] 16  BP: (144)/(101) 144/101    Anticipated Surgical Procedure:  Ultrasound guided right breast biopsy with clip placement    The risks, benefits and alternatives of this procedure have been discussed with the patient or responsible party: Yes        Josemanuel Hoover Jr., MD  21  14:57 EST

## 2021-11-10 LAB
LAB AP CASE REPORT: NORMAL
LAB AP CLINICAL INFORMATION: NORMAL
LAB AP DIAGNOSIS COMMENT: NORMAL
PATH REPORT.FINAL DX SPEC: NORMAL
PATH REPORT.GROSS SPEC: NORMAL

## 2021-11-11 ENCOUNTER — TELEPHONE (OUTPATIENT)
Dept: SURGERY | Facility: CLINIC | Age: 31
End: 2021-11-11

## 2021-11-11 DIAGNOSIS — D24.1 FIBROADENOMA OF RIGHT BREAST: Primary | ICD-10-CM

## 2021-11-11 DIAGNOSIS — N63.10 BREAST MASS, RIGHT: ICD-10-CM

## 2021-11-11 NOTE — TELEPHONE ENCOUNTER
New patient appointment with Pushpa Webster NP is scheduled on 4/4/2022 @ 2:30pm.    Called and spoke to patient, patient expressed v/u of appointment time.    Sent patient a reminder letter in the mail.

## 2021-12-23 ENCOUNTER — TELEPHONE (OUTPATIENT)
Dept: INTERNAL MEDICINE | Facility: CLINIC | Age: 31
End: 2021-12-23

## 2021-12-23 RX ORDER — ALBUTEROL SULFATE 2.5 MG/3ML
2.5 SOLUTION RESPIRATORY (INHALATION) EVERY 4 HOURS PRN
Qty: 45 ML | Refills: 3 | Status: SHIPPED | OUTPATIENT
Start: 2021-12-23

## 2021-12-23 RX ORDER — PREDNISONE 20 MG/1
TABLET ORAL
Qty: 11 TABLET | Refills: 0 | Status: SHIPPED | OUTPATIENT
Start: 2021-12-23 | End: 2022-01-01

## 2021-12-23 NOTE — TELEPHONE ENCOUNTER
Caller: Jeanine Monteiro    Relationship: Self    Best call back number: [phone]    What medication are you requesting: STEROID AND ALBUTEROL FOR HER NEBULIZER    What are your current symptoms: SINUS DRAINAGE AND ASTHMA FLARE UP    How long have you been experiencing symptoms: FEW DAYS    Have you had these symptoms before:    [] Yes  [] No    Have you been treated for these symptoms before:   [] Yes  [] No    If a prescription is needed, what is your preferred pharmacy and phone number: [facility] #[id number] - [place], [place] - [address]  AT [facility] & [facility] [phone] [facility] [phone] FX     Additional notes: PATIENT STATES THAT SHE WAS RECENTLY GETTING OVER A COLD. SHE STATES THAT SHE HAS BEEN EXPERIENCING A LOT OF DRAINAGE AND STATES THAT SHE HAS TO NOW WEAR A MASK WHILE WORKING AGAIN. SHE STATES THAT SHE THINK IT'S TRIGGERED HER ASTHMA. SHE STATES THAT SHE HAS BEEN HAVING DIFFICULTY BREATHING AND HAS HAS TO USE HER RESCUE INHALER TWICE TODAY.

## 2021-12-23 NOTE — TELEPHONE ENCOUNTER
Given the upcoming holiday, will agree to send in the steroid and albuterl for her neb, but she should probably be COVID tested and if she is not better by early next week should get to an urgent care or in here to be seen

## 2022-01-03 ENCOUNTER — OFFICE VISIT (OUTPATIENT)
Dept: SURGERY | Facility: CLINIC | Age: 32
End: 2022-01-03

## 2022-01-03 VITALS
BODY MASS INDEX: 35.88 KG/M2 | HEIGHT: 62 IN | SYSTOLIC BLOOD PRESSURE: 118 MMHG | HEART RATE: 96 BPM | OXYGEN SATURATION: 96 % | WEIGHT: 195 LBS | RESPIRATION RATE: 16 BRPM | DIASTOLIC BLOOD PRESSURE: 72 MMHG

## 2022-01-03 DIAGNOSIS — N60.12 FIBROCYSTIC BREAST CHANGES, BILATERAL: ICD-10-CM

## 2022-01-03 DIAGNOSIS — N60.21 FIBROADENOSIS OF RIGHT BREAST: Primary | ICD-10-CM

## 2022-01-03 DIAGNOSIS — N60.11 FIBROCYSTIC BREAST CHANGES, BILATERAL: ICD-10-CM

## 2022-01-03 DIAGNOSIS — N64.4 MASTODYNIA: ICD-10-CM

## 2022-01-03 PROCEDURE — 99213 OFFICE O/P EST LOW 20 MIN: CPT | Performed by: NURSE PRACTITIONER

## 2022-01-03 NOTE — PROGRESS NOTES
[facility]     Referring Provider: Dr. Arturo Mullins   Chief complaint: R breast lump with pain     HPI: Ms. Jeanine Monteiro is a 30 yo woman, seen at the request of Arturo Mullins MD, for right breast biopsy follow up.       I personally reviewed her records and summarized her relevant breast history/imaging:    She has a personal history of right breast benign fibroadenoma, US core biopsy in 11/21.        She has a family history of breast cancer in her Paternal Great Aunt in her 60s.  She denies any family history of ovarian cancer.     8/4/21:  Clinic visit with Arturo Mullins MD  Here with left breast pain for last 6 weeks or so, no specific injury, left upper breast, worse with lifting and carrying items, does a lot of lifting over her head with trays at work; no redness, has felt it being warm in the past; has noted that his pain is worse with menstrual cycles; did recently try to have an in home insemination prior to this starting, did not take any medications; does note that the left breast has always been larger than the right  Comments: Breast and axillary exams normal bilaterally, supervised by richard paige, left upper pectoral muscle ttp     11/2/21: bilateral diagnostic mammogram with tomosynthesis, bilateral complete breast US at [facility]  HISTORY:  31-year-old female complaining of pain in the upper outer quadrant left breast since August. The breast feels different according to the patient. She denies any palpable abnormality. No reported family history of malignancy.  MAMMOGRAM FINDINGS:  Breast parenchyma is composed of scattered fibroglandular densities.  There is fibroglandular predominance in the upper outer quadrants bilaterally, right greater than left. In the upper outer quadrant right breast there is a 13 mm oval-shaped nodular density associated with the prominent fibroglandular tissue. Ultrasound was performed for further evaluation. Suggestion of potential small 9 mm  nodule versus prominent breast tissue in the upper outer quadrant left breast in the area of dense breast tissue as well. Ultrasound was performed for further evaluation. There is a benign intramammary lymph node in the posterior upper outer quadrant left breast.   ULTRASOUND FINDINGS:  The patient was initially scanned independently by the technologist and then rescanned in my presence.   Right breast: Imaging of the upper outer quadrant right breast was performed from 9:00 through 12:00. In the 9:00 position 8 cm from the nipple there is a solid hypoechoic partially shadowing indeterminate nodule measuring about 1.4 cm. The margins are somewhat irregular rather than smooth. It does not have typical imaging features for benign fibroadenoma although that is a differential consideration given patient age. Malignancy not excluded and ultrasound-guided core biopsy is  recommended for further evaluation. This corresponds to the lesion of interest on the patient's mammogram. Imaging of the remainder of the upper outer quadrant right breast and subareolar right breast is negative with the exception of a tiny cyst in the 12:00 position measuring about 3 mm.   Left breast: Imaging of the upper outer quadrant left breast was performed from the 12:00 through 3:00 positions. Imaging is negative demonstrating normal-appearing breast tissue. Subareolar left breast negative. Imaging findings are felt to be concordant with mammography.  No mammography correlate ultrasound correlate for the patient's complaint of pain symptoms.   SUMMARY:  Suspicious solid mass in the upper outer quadrant right breast the 9:00 position measuring 1.4 cm. Ultrasound-guided core biopsy recommended.  FINDINGS and recommendations for biopsy have been discussed with the patient in the department. Mammography and ultrasound of the upper outer quadrant left breast is negative and clinical considerations should determine additional imaging at this time.    IMPRESSION:  1. Suspicious 1.4 cm solid nodule in the upper outer quadrant right breast. Ultrasound-guided core biopsy recommended.  2. Negative evaluation of the left breast with mammography and ultrasound. Clinical considerations to determine additional imaging at this time.  3. FINDINGS and recommendations for biopsy have been personally discussed with the patient and by telephone with Dr. Barron at the time of this interpretation.   BI-RADS CATEGORY 4: Suspicious abnormality    11/9/21: Right US core biopsy at [facility]  PROCEDURE NOTE: Informed consent was obtained. Preliminary sonography of the right breast was performed. The lesion at the 9:00 position on the order of 8 cm from the nipple was visualized. The overlying skin was prepped in the usual sterile fashion. Local anesthesia was achieved with 1% lidocaine. A nick was made in the skin with a scalpel. Through the  nick was inserted an 11-gauge Mammotome vacuum assisted device under sonographic guidance. Multiple tissue specimens were obtained. A bowtie-shaped metallic clip was placed to jess the site. Pressure was applied until bleeding subsided and the overlying skin was cleaned and bandaged. The patient tolerated the procedure without evidence for  complication.     The pathology result has returned as a fibroadenoma with stromal fibrosis, adenosis and fibrocystic change. This is concordant with imaging findings.   IMPRESSION:   IMPRESSION:   Technically successful ultrasound guided Mammotome vacuum assisted right breast biopsy with placement of a metallic clip. The pathology result has returned as a fibroadenoma. Clinical follow-up is recommended.    11/9/2021: pathology, right US core biopsy  Final Diagnosis   1.  Right Breast at 9 o'clock, 8 cm from Nipple, (not for calcifications), Ultrasound-Guided Biopsy:                A.  Fibroadenoma, stromal fibrosis, adenosis and fibrocystic change.                 B.  Rare calcification present in benign breast  tissue.                C.  No in situ nor infiltrating carcinoma identified.            Today she presents with continued occasional left breast discomfort that she notices with increased activity.  It has improved lately when she started taking Mobic for back pain.  She has noticed some right breast tenderness at the biopsy site, and she notices a tender nodule at the site of biopsy.  She denies any  skin changes, or nipple discharge.  She was by herself in clinic today.         Review of Systems   Skin: Negative.         The patient denies any changes to the skin of the breasts.        Medications:    Current Outpatient Medications:   •  albuterol (PROVENTIL) (2.5 MG/3ML) 0.083% nebulizer solution, Take 2.5 mg by nebulization Every 4 (Four) Hours As Needed for Wheezing or Shortness of Air., Disp: 45 mL, Rfl: 3  •  albuterol sulfate  (90 Base) MCG/ACT inhaler, Inhale 2 puffs 4 (Four) Times a Day As Needed for Wheezing., Disp: 1 inhaler, Rfl: 11  •  Breo Ellipta 100-25 MCG/INH inhaler, INHALE 1 PUFF BY MOUTH DAILY, Disp: 180 each, Rfl: 0  •  cetirizine (zyrTEC) 5 MG tablet, Take 5 mg by mouth Daily., Disp: , Rfl:   •  meloxicam (MOBIC) 15 MG tablet, TAKE 1 TABLET BY MOUTH DAILY, Disp: 30 tablet, Rfl: 2  •  busPIRone (BUSPAR) 15 MG tablet, Take 1 tablet by mouth 2 (Two) Times a Day., Disp: 180 tablet, Rfl: 1  •  montelukast (SINGULAIR) 10 MG tablet, TAKE 1 TABLET BY MOUTH EVERY NIGHT, Disp: 30 tablet, Rfl: 3    Allergies:  Allergies   Allergen Reactions   • Penicillins Hives   • Latex Rash       Medical history:  Past Medical History:   Diagnosis Date   • Allergic    • Asthma    • Diverticulitis        Surgical History:  Past Surgical History:   Procedure Laterality Date   •  SECTION     • TONSILLECTOMY         Family History:  Family History   Problem Relation Age of Onset   • Asthma Mother    • Diverticulitis Mother    • Hypertension Paternal Grandmother    • Breast cancer Paternal Aunt 60         great paternal aunt   • Lung cancer Paternal Aunt    • Leukemia Maternal Grandfather        Social History:   Social History     Socioeconomic History   • Marital status: Single   Tobacco Use   • Smoking status: Former Smoker     Quit date: 2018     Years since quitting: 3.7   • Smokeless tobacco: Never Used   Vaping Use   • Vaping Use: Every day   • Substances: Nicotine   Substance and Sexual Activity   • Alcohol use: Yes     Alcohol/week: 6.0 standard drinks     Types: 6 Glasses of wine per week   • Drug use: Never   • Sexual activity: Yes     Partners: Female     Birth control/protection: None     Patient drinks 1 servings of caffeine per day.       GYNECOLOGIC HISTORY:   . P: 1. AB: 1.  Last menstrual period: 19  Age at menarche: 11  Age at first childbirth: 19  Lactation/How long: 3 months  Age at menopause: n/a  Total years of oral contraceptive use: For 6 years. Stopped about 10 years ago.  Total years of hormone replacement therapy: none      Physical Exam  Vitals:    22 1042   BP: 118/72   Pulse: 96   Resp: 16   SpO2: 96%     ECOG 0 - Asymptomatic  General: NAD, well appearing  Psych: a&o x 3, normal mood and affect  Eyes: EOMI, no scleral icterus  ENMT: neck supple without masses or thyromegaly, mucus membranes moist  Resp: normal effort, CTAB  CV: RRR, no murmurs, no edema   GI: soft, NT, ND  MSK: normal gait, normal ROM in bilateral shoulders  Lymph nodes:  no cervical, supraclavicular or axillary lymphadenopathy  Breast: slightly asymmetric, left > right  Right:  No visible abnormalities on inspection while seated, with arms raised or hands on hips. No masses, skin changes, or nipple abnormalities.  At the 0900 position, 7 CFN an area of tender thickening is noted at site of recent biopsy.  Left:  No visible abnormalities on inspection while seated, with arms raised or hands on hips. No masses, skin changes, or nipple abnormalities.  No tenderness reported with exam  today.      Assessment:    1)  31 y.o. F with biopsy proven fibroadenoma right breast  2)  Left breast pain with negative imaging  3)  Benign breast changes      Discussion:  1) Imaging and pathology results were reviewed.  We discussed the diagnosis of fibroadenoma.  I reviewed it's benign nature and usually benign natural history, often regressing with time.  We discussed indications for excision including symptoms (such as pain or cosmesis), large at baseline (>3cm), or interval increase in size on imaging or exam.  She currently has no indications for excision or imaging surveillance.  I explained that we will continue to monitor the mass clinically.    2)  We discussed breast pain and how it can sometimes be difficult to treat. We discussed that this is often related to hormonal changes. Caffeine and nicotine can also play a role in breast pain, and I encouraged her to continue only moderate caffeine consumption and continue avoiding nicotine. We also discussed the importance of wearing a good supportive bra. She can try wearing a sports bra during the day. She also can try sleeping in a sports bra or tight camisole. We discussed additional therapies such as vitamin E supplementation and that this may or may not be useful. We also discussed using OTC tylenol or ibuprofen and/or topical products for prn pain control.     I reassured her that given her recent extensive imaging and negative clinical exam, I am not worried about any underlying anatomic abnormality as a source of the pain.    3)  We discussed fibrocystic change and how this is benign and can sometimes be associated with increased breast density. I reassured her today that she had a normal clinical breast exam and recent normal bilateral imaging. I explained that cords of dense breast tissue or focal areas of fibrocystic change can sometimes feel like a lump on exam. This is common in women like her with heterogeneous and nodular tissue. I encouraged  her to become familiar with her own self-exam over the next few months to establish a personal baseline.       Plan:  1. Wear sports bras during the day when possible. Wear sports bra or tight camisole at night while sleeping. She states that it is difficult for her to wear sports bras, we discussed wearing more supportive bras without underwires instead.  2. Take Vitamin E, 200 U, twice daily.   3. Continue to reduce caffeine intake.   4. May use OTC tylenol or ibuprofen, or topical products for pain control.     - exam in 6 months    I have advised the patient to continue monthly breast self examination and to return to see me in 6 months for exam only to evaluate her symptoms.   She was also advised to notify us if she develops new breast symptoms.       JESSICA Menchaca        CC:  MD Sumeet Rosales Nicole B, MD EMR Dragon/transcription disclaimer:  Dictated using Dragon dictation

## 2022-01-07 ENCOUNTER — PATIENT ROUNDING (BHMG ONLY) (OUTPATIENT)
Dept: SURGERY | Facility: CLINIC | Age: 32
End: 2022-01-07

## 2022-01-07 NOTE — PROGRESS NOTES
January 7, 2022    Hello, may I speak with Jeanine Monteiro?    My name is Darya.     I am  with [facility]  [address] [facility] [address] [phone].    Before we get started may I verify your date of birth? 1990    I am calling to officially welcome you to our practice and ask about your recent visit. Is this a good time to talk? Yes.    Tell me about your visit with us. What things went well?  Went very well. Very informative and kind.        We're always looking for ways to make our patients' experiences even better. Do you have recommendations on ways we may improve?  No.    Overall were you satisfied with your first visit to our practice? Yes.       I appreciate you taking the time to speak with me today. Is there anything else I can do for you? No.      Thank you, and have a great day.

## 2022-01-17 ENCOUNTER — TELEPHONE (OUTPATIENT)
Dept: INTERNAL MEDICINE | Facility: CLINIC | Age: 32
End: 2022-01-17

## 2022-01-17 NOTE — TELEPHONE ENCOUNTER
Refill sent for one inhaler, but she was out of refills because she is due for a follow up. It looks like at one time she had a follow up scheduled with me for this month but it is no longer active, so she needs to reschedule

## 2022-01-17 NOTE — TELEPHONE ENCOUNTER
Caller: Jeanine Monteiro    Relationship: Self    Best call back number: [phone]    Requested Prescriptions:   Requested Prescriptions     Pending Prescriptions Disp Refills   • Fluticasone Furoate-Vilanterol (Breo Ellipta) 100-25 MCG/INH inhaler 180 each 0     Sig: Inhale 1 puff Daily.        Pharmacy where request should be sent: [id number] [address] AT [facility] [phone] [facility] [phone]      Additional details provided by patient: OUT OF MEDICATION     Does the patient have less than a 3 day supply:  [x] Yes  [] No    James Storm Rep   01/17/22 11:42 EST

## 2022-01-17 NOTE — TELEPHONE ENCOUNTER
Caller: BENITO    Relationship to patient: PASSPORT     Best call back number: [phone]     Patient is needing: FEB 28TH  WILL NEED PRIOR AUTHORIZATION TO MED IMPACT FOR  : Breo Ellipta 100-25 MCG/INH inhaler  BENITO WANTED PROVIDER TO BE AWARE .

## 2022-01-31 ENCOUNTER — OFFICE VISIT (OUTPATIENT)
Dept: INTERNAL MEDICINE | Facility: CLINIC | Age: 32
End: 2022-01-31

## 2022-01-31 VITALS
WEIGHT: 194 LBS | HEART RATE: 87 BPM | OXYGEN SATURATION: 96 % | BODY MASS INDEX: 35.7 KG/M2 | SYSTOLIC BLOOD PRESSURE: 130 MMHG | HEIGHT: 62 IN | RESPIRATION RATE: 16 BRPM | DIASTOLIC BLOOD PRESSURE: 84 MMHG | TEMPERATURE: 97.1 F

## 2022-01-31 DIAGNOSIS — R63.5 WEIGHT GAIN: ICD-10-CM

## 2022-01-31 DIAGNOSIS — J45.40 MODERATE PERSISTENT ALLERGIC ASTHMA: Primary | Chronic | ICD-10-CM

## 2022-01-31 DIAGNOSIS — F41.9 ANXIETY: Chronic | ICD-10-CM

## 2022-01-31 PROCEDURE — 99214 OFFICE O/P EST MOD 30 MIN: CPT | Performed by: INTERNAL MEDICINE

## 2022-01-31 RX ORDER — ALBUTEROL SULFATE 90 UG/1
2 AEROSOL, METERED RESPIRATORY (INHALATION) 4 TIMES DAILY PRN
Qty: 8 G | Refills: 3 | Status: SHIPPED | OUTPATIENT
Start: 2022-01-31 | End: 2023-03-31 | Stop reason: SDUPTHER

## 2022-01-31 RX ORDER — BUSPIRONE HYDROCHLORIDE 15 MG/1
15 TABLET ORAL 2 TIMES DAILY
Qty: 180 TABLET | Refills: 1 | Status: SHIPPED | OUTPATIENT
Start: 2022-01-31 | End: 2023-01-20 | Stop reason: SDUPTHER

## 2022-01-31 RX ORDER — MONTELUKAST SODIUM 10 MG/1
10 TABLET ORAL NIGHTLY
Qty: 90 TABLET | Refills: 3 | Status: SHIPPED | OUTPATIENT
Start: 2022-01-31 | End: 2023-02-07 | Stop reason: SDUPTHER

## 2022-01-31 NOTE — ASSESSMENT & PLAN NOTE
CONTROLLED  - cont on controller inhaler with Breo once daily, discussed potential contribution to weight gain, but do feel she gets enough benefit from this to warrant continuation--> refills sent  - alb Rx in to have at home for prn use with onset of symptoms  - cont singulair nightly for asthma and allergy trigger  - avoid asthma triggers

## 2022-01-31 NOTE — PROGRESS NOTES
"Chief Complaint  Follow-up and Asthma    Subjective          Jeanine Monteiro presents to [facility] for follow up and med refills. Pt taking all medications daily as prescribed with good reported compliance. No issues or side effects with meds.   Had COVID back in Jan, has fully recovered.   Had been off Breo for a while and noticed her asthma worsened and she was not able to tolerate exercise, but was actually able to lose weight bc she didn't have the same appetite.     Objective   Vital Signs:     /84   Pulse 87   Temp 97.1 °F (36.2 °C)   Resp 16   Ht 157.5 cm (62\")   Wt 88 kg (194 lb)   SpO2 96%   BMI 35.48 kg/m²     Physical Exam  Vitals and nursing note reviewed.   Constitutional:       General: She is not in acute distress.     Appearance: Normal appearance.   Cardiovascular:      Rate and Rhythm: Normal rate and regular rhythm.      Pulses: Normal pulses.      Heart sounds: Normal heart sounds. No murmur heard.      Pulmonary:      Effort: Pulmonary effort is normal. No respiratory distress.      Breath sounds: Normal breath sounds.   Abdominal:      General: Abdomen is flat. Bowel sounds are normal.      Palpations: Abdomen is soft.      Tenderness: There is no abdominal tenderness.   Musculoskeletal:      Right lower leg: No edema.      Left lower leg: No edema.   Skin:     Comments: Scar present on L cheek, sensitive, no active drainage   Neurological:      Mental Status: She is alert and oriented to person, place, and time. Mental status is at baseline.   Psychiatric:         Mood and Affect: Mood normal.         Behavior: Behavior normal.          Result Review : : None     Assessment and Plan      Diagnoses and all orders for this visit:    1. Moderate persistent allergic asthma (Primary)  Assessment & Plan:  CONTROLLED  - cont on controller inhaler with Breo once daily, discussed potential contribution to weight gain, but do feel she gets " enough benefit from this to warrant continuation--> refills sent  - alb Rx in to have at home for prn use with onset of symptoms  - cont singulair nightly for asthma and allergy trigger  - avoid asthma triggers        Orders:  -     montelukast (SINGULAIR) 10 MG tablet; Take 1 tablet by mouth Every Night.  Dispense: 90 tablet; Refill: 3  -     Fluticasone Furoate-Vilanterol (Breo Ellipta) 100-25 MCG/INH inhaler; Inhale 1 puff Daily.  Dispense: 180 each; Refill: 1  -     albuterol sulfate  (90 Base) MCG/ACT inhaler; Inhale 2 puffs 4 (Four) Times a Day As Needed for Wheezing or Shortness of Air.  Dispense: 8 g; Refill: 3    2. Anxiety  Assessment & Plan:  CONTROLLED  - not taking any medications daily at this time  - was previously on buspar schedule but stopped this several months ago, does feel like it works very well for her on a prn basis--> will send in refill for her on this medication to use in prn capacity  - Reviewed potential side effects of medication including sexual performance changes, insomnia, fatigue, weight changes. Pt tolerating well without any issues.       Orders:  -     busPIRone (BUSPAR) 15 MG tablet; Take 1 tablet by mouth 2 (Two) Times a Day.  Dispense: 180 tablet; Refill: 1    3. Weight gain  -     CBC & Differential  -     Comprehensive Metabolic Panel  -     Hemoglobin A1c  -     TSH        Follow Up   Return in about 6 months (around 7/31/2022) for Annual physical.    Patient was given instructions and counseling regarding her condition or for health maintenance advice. Please see specific information pulled into the AVS if appropriate.     Rani Fay MD  [facility]  Phone: [phone]  Fax: [phone]

## 2022-01-31 NOTE — ASSESSMENT & PLAN NOTE
CONTROLLED  - not taking any medications daily at this time  - was previously on buspar schedule but stopped this several months ago, does feel like it works very well for her on a prn basis--> will send in refill for her on this medication to use in prn capacity  - Reviewed potential side effects of medication including sexual performance changes, insomnia, fatigue, weight changes. Pt tolerating well without any issues.

## 2022-02-01 LAB
ALBUMIN SERPL-MCNC: 4.6 G/DL (ref 3.8–4.8)
ALBUMIN/GLOB SERPL: 2.1 {RATIO} (ref 1.2–2.2)
ALP SERPL-CCNC: 66 IU/L (ref 44–121)
ALT SERPL-CCNC: 18 IU/L (ref 0–32)
AST SERPL-CCNC: 18 IU/L (ref 0–40)
BASOPHILS # BLD AUTO: 0.1 X10E3/UL (ref 0–0.2)
BASOPHILS NFR BLD AUTO: 1 %
BILIRUB SERPL-MCNC: 0.2 MG/DL (ref 0–1.2)
BUN SERPL-MCNC: 13 MG/DL (ref 6–20)
BUN/CREAT SERPL: 15 (ref 9–23)
CALCIUM SERPL-MCNC: 10.2 MG/DL (ref 8.7–10.2)
CHLORIDE SERPL-SCNC: 102 MMOL/L (ref 96–106)
CO2 SERPL-SCNC: 22 MMOL/L (ref 20–29)
CREAT SERPL-MCNC: 0.87 MG/DL (ref 0.57–1)
EOSINOPHIL # BLD AUTO: 0.2 X10E3/UL (ref 0–0.4)
EOSINOPHIL NFR BLD AUTO: 3 %
ERYTHROCYTE [DISTWIDTH] IN BLOOD BY AUTOMATED COUNT: 13.1 % (ref 11.7–15.4)
GLOBULIN SER CALC-MCNC: 2.2 G/DL (ref 1.5–4.5)
GLUCOSE SERPL-MCNC: 97 MG/DL (ref 65–99)
HBA1C MFR BLD: 5.5 % (ref 4.8–5.6)
HCT VFR BLD AUTO: 43.6 % (ref 34–46.6)
HGB BLD-MCNC: 14.5 G/DL (ref 11.1–15.9)
IMM GRANULOCYTES # BLD AUTO: 0 X10E3/UL (ref 0–0.1)
IMM GRANULOCYTES NFR BLD AUTO: 0 %
LYMPHOCYTES # BLD AUTO: 2.2 X10E3/UL (ref 0.7–3.1)
LYMPHOCYTES NFR BLD AUTO: 36 %
MCH RBC QN AUTO: 29.7 PG (ref 26.6–33)
MCHC RBC AUTO-ENTMCNC: 33.3 G/DL (ref 31.5–35.7)
MCV RBC AUTO: 89 FL (ref 79–97)
MONOCYTES # BLD AUTO: 0.6 X10E3/UL (ref 0.1–0.9)
MONOCYTES NFR BLD AUTO: 9 %
NEUTROPHILS # BLD AUTO: 3.2 X10E3/UL (ref 1.4–7)
NEUTROPHILS NFR BLD AUTO: 51 %
PLATELET # BLD AUTO: 323 X10E3/UL (ref 150–450)
POTASSIUM SERPL-SCNC: 4.1 MMOL/L (ref 3.5–5.2)
PROT SERPL-MCNC: 6.8 G/DL (ref 6–8.5)
RBC # BLD AUTO: 4.89 X10E6/UL (ref 3.77–5.28)
SODIUM SERPL-SCNC: 140 MMOL/L (ref 134–144)
TSH SERPL-ACNC: 1.21 UIU/ML (ref 0.45–4.5)
WBC # BLD AUTO: 6.2 X10E3/UL (ref 3.4–10.8)

## 2022-02-07 RX ORDER — MELOXICAM 15 MG/1
15 TABLET ORAL DAILY
Qty: 30 TABLET | Refills: 2 | OUTPATIENT
Start: 2022-02-07

## 2022-02-07 NOTE — TELEPHONE ENCOUNTER
Rx Refill Note  Requested Prescriptions     Pending Prescriptions Disp Refills   • meloxicam (MOBIC) 15 MG tablet 30 tablet 2     Sig: Take 1 tablet by mouth Daily.      Last office visit with prescribing clinician: 1/31/2022      Next office visit with prescribing clinician: 8/1/2022            Shirley Law MA  02/07/22, 12:03 EST

## 2022-06-09 DIAGNOSIS — J45.40 MODERATE PERSISTENT ALLERGIC ASTHMA: Chronic | ICD-10-CM

## 2022-06-09 NOTE — TELEPHONE ENCOUNTER
Caller: Jeanine Monteiro    Relationship: Self    Best call back number:     Requested Prescriptions:   Requested Prescriptions     Pending Prescriptions Disp Refills   • Fluticasone Furoate-Vilanterol (Breo Ellipta) 100-25 MCG/INH inhaler 180 each 1     Sig: Inhale 1 puff Daily.        Pharmacy where request should be sent:  [facility]  [phone]    Additional details provided by patient: PATIENT HAS BEEN WITHOUT THIS FOR A WEEK  NOW    Does the patient have less than a 3 day supply:  [x] Yes  [] No    James Zavaleta Rep   06/09/22 10:44 EDT

## 2022-06-09 NOTE — TELEPHONE ENCOUNTER
Rx Refill Note  Requested Prescriptions     Pending Prescriptions Disp Refills   • Fluticasone Furoate-Vilanterol (Breo Ellipta) 100-25 MCG/INH inhaler 180 each 1     Sig: Inhale 1 puff Daily.      Last office visit with prescribing clinician: 1/31/2022      Next office visit with prescribing clinician: 6/20/2022            Shirley Law MA  06/09/22, 10:52 EDT

## 2022-06-20 ENCOUNTER — OFFICE VISIT (OUTPATIENT)
Dept: INTERNAL MEDICINE | Facility: CLINIC | Age: 32
End: 2022-06-20

## 2022-06-20 VITALS
DIASTOLIC BLOOD PRESSURE: 80 MMHG | TEMPERATURE: 96.6 F | SYSTOLIC BLOOD PRESSURE: 120 MMHG | HEART RATE: 100 BPM | OXYGEN SATURATION: 98 % | BODY MASS INDEX: 36.07 KG/M2 | WEIGHT: 196 LBS | HEIGHT: 62 IN | RESPIRATION RATE: 16 BRPM

## 2022-06-20 DIAGNOSIS — K58.0 IRRITABLE BOWEL SYNDROME WITH DIARRHEA: Primary | ICD-10-CM

## 2022-06-20 PROCEDURE — 99214 OFFICE O/P EST MOD 30 MIN: CPT | Performed by: INTERNAL MEDICINE

## 2022-06-20 RX ORDER — DICYCLOMINE HYDROCHLORIDE 10 MG/1
10 CAPSULE ORAL
Qty: 120 CAPSULE | Refills: 1 | Status: SHIPPED | OUTPATIENT
Start: 2022-06-20 | End: 2022-10-06

## 2022-06-20 NOTE — PROGRESS NOTES
"Chief Complaint  Bloated and Diarrhea    Subjective          Jeanine Monteiro presents to [facility] for diarrhea and bloating. She has noticed changes in her gut over past couple years. Has had diverticulitis in the past and this does not feel like that did. She will bloat and get abd distention if she eats or drinks anything. She has now had to change her dietary habits with smaller meals and food avoidance. Has diarrhea first thing in the morning almost every day, does get temporary relief after she goes to the bathroom.     Objective   Vital Signs:     /80   Pulse 100   Temp 96.6 °F (35.9 °C)   Resp 16   Ht 157.5 cm (62\")   Wt 88.9 kg (196 lb)   SpO2 98%   BMI 35.85 kg/m²     Physical Exam  Vitals and nursing note reviewed.   Constitutional:       General: She is not in acute distress.     Appearance: Normal appearance.   Cardiovascular:      Rate and Rhythm: Normal rate and regular rhythm.      Pulses: Normal pulses.      Heart sounds: Normal heart sounds. No murmur heard.  Pulmonary:      Effort: Pulmonary effort is normal. No respiratory distress.      Breath sounds: Normal breath sounds.   Abdominal:      General: Abdomen is flat. Bowel sounds are normal. There is distension.      Palpations: Abdomen is soft. There is no mass.      Tenderness: There is abdominal tenderness (bilateral upper quadrants tenderness). There is no guarding or rebound.   Musculoskeletal:      Right lower leg: No edema.      Left lower leg: No edema.   Neurological:      Mental Status: She is alert and oriented to person, place, and time. Mental status is at baseline.   Psychiatric:         Mood and Affect: Mood normal.         Behavior: Behavior normal.          Result Review : : None     Assessment and Plan      Diagnoses and all orders for this visit:    1. Irritable bowel syndrome with diarrhea (Primary)  Assessment & Plan:  UNCONTROLLED  - discussed pathophysiology " of IBS and typical illness course  - pt without any red flag symptoms to necessitate more immediate advanced evaluation, clinical picture supports IBS diagnosis  - FODMAPS diet reviewed  - rec daily IBGard supplement  - Rx sent for Bentyl as below, reviewed flexibility of dosing so pt can play with schedule at home and find what works for her  - if not improving, will follow up at next appt and proceed with further testing    Orders:  -     dicyclomine (Bentyl) 10 MG capsule; Take 1 capsule by mouth 4 (Four) Times a Day Before Meals & at Bedtime.  Dispense: 120 capsule; Refill: 1    Follow Up   Return for Next scheduled follow up.    Patient was given instructions and counseling regarding her condition or for health maintenance advice. Please see specific information pulled into the AVS if appropriate.     Rani Fay MD  [facility] [place] [facility]  Phone: [phone]  Fax: [phone]

## 2022-06-20 NOTE — ASSESSMENT & PLAN NOTE
UNCONTROLLED  - discussed pathophysiology of IBS and typical illness course  - pt without any red flag symptoms to necessitate more immediate advanced evaluation, clinical picture supports IBS diagnosis  - FODMAPS diet reviewed  - rec daily IBGard supplement  - Rx sent for Bentyl as below, reviewed flexibility of dosing so pt can play with schedule at home and find what works for her  - if not improving, will follow up at next appt and proceed with further testing

## 2022-07-20 ENCOUNTER — TELEPHONE (OUTPATIENT)
Dept: SURGERY | Facility: CLINIC | Age: 32
End: 2022-07-20

## 2022-07-20 NOTE — TELEPHONE ENCOUNTER
Called pt to r/s missed appt. She stated she is doing better and does not wish to be seen right now. She will call us in the future should she need us.

## 2022-08-01 ENCOUNTER — OFFICE VISIT (OUTPATIENT)
Dept: INTERNAL MEDICINE | Facility: CLINIC | Age: 32
End: 2022-08-01

## 2022-08-01 ENCOUNTER — HOSPITAL ENCOUNTER (OUTPATIENT)
Dept: CT IMAGING | Facility: HOSPITAL | Age: 32
Discharge: HOME | End: 2022-08-01
Admitting: INTERNAL MEDICINE

## 2022-08-01 VITALS
HEART RATE: 71 BPM | WEIGHT: 191.2 LBS | TEMPERATURE: 97 F | OXYGEN SATURATION: 97 % | HEIGHT: 62 IN | SYSTOLIC BLOOD PRESSURE: 122 MMHG | BODY MASS INDEX: 35.19 KG/M2 | DIASTOLIC BLOOD PRESSURE: 82 MMHG

## 2022-08-01 DIAGNOSIS — F41.9 ANXIETY AND DEPRESSION: ICD-10-CM

## 2022-08-01 DIAGNOSIS — R10.32 LEFT LOWER QUADRANT ABDOMINAL PAIN: ICD-10-CM

## 2022-08-01 DIAGNOSIS — F32.A ANXIETY AND DEPRESSION: ICD-10-CM

## 2022-08-01 DIAGNOSIS — Z00.00 ROUTINE GENERAL MEDICAL EXAMINATION AT A HEALTH CARE FACILITY: Primary | ICD-10-CM

## 2022-08-01 DIAGNOSIS — Z11.59 ENCOUNTER FOR HEPATITIS C SCREENING TEST FOR LOW RISK PATIENT: ICD-10-CM

## 2022-08-01 LAB
BILIRUB BLD-MCNC: NEGATIVE MG/DL
CLARITY, POC: CLEAR
COLOR UR: YELLOW
EXPIRATION DATE: NORMAL
GLUCOSE UR STRIP-MCNC: NEGATIVE MG/DL
KETONES UR QL: NEGATIVE
LEUKOCYTE EST, POC: NEGATIVE
Lab: NORMAL
NITRITE UR-MCNC: NEGATIVE MG/ML
PH UR: 5 [PH] (ref 5–8)
PROT UR STRIP-MCNC: NEGATIVE MG/DL
RBC # UR STRIP: NEGATIVE /UL
SP GR UR: 1 (ref 1–1.03)
UROBILINOGEN UR QL: NORMAL

## 2022-08-01 PROCEDURE — 74177 CT ABD & PELVIS W/CONTRAST: CPT

## 2022-08-01 PROCEDURE — 2014F MENTAL STATUS ASSESS: CPT | Performed by: INTERNAL MEDICINE

## 2022-08-01 PROCEDURE — 3008F BODY MASS INDEX DOCD: CPT | Performed by: INTERNAL MEDICINE

## 2022-08-01 PROCEDURE — 25010000002 IOPAMIDOL 61 % SOLUTION: Performed by: INTERNAL MEDICINE

## 2022-08-01 PROCEDURE — 99395 PREV VISIT EST AGE 18-39: CPT | Performed by: INTERNAL MEDICINE

## 2022-08-01 PROCEDURE — 0 DIATRIZOATE MEGLUMINE & SODIUM PER 1 ML: Performed by: INTERNAL MEDICINE

## 2022-08-01 PROCEDURE — 81003 URINALYSIS AUTO W/O SCOPE: CPT | Performed by: INTERNAL MEDICINE

## 2022-08-01 RX ORDER — METRONIDAZOLE 500 MG/1
500 TABLET ORAL 3 TIMES DAILY
Qty: 21 TABLET | Refills: 0 | Status: SHIPPED | OUTPATIENT
Start: 2022-08-01 | End: 2022-10-06

## 2022-08-01 RX ORDER — CIPROFLOXACIN 500 MG/1
500 TABLET, FILM COATED ORAL 2 TIMES DAILY
Qty: 14 TABLET | Refills: 0 | Status: SHIPPED | OUTPATIENT
Start: 2022-08-01 | End: 2022-10-06

## 2022-08-01 RX ORDER — BUPROPION HYDROCHLORIDE 150 MG/1
150 TABLET ORAL DAILY
Qty: 30 TABLET | Refills: 1 | Status: SHIPPED | OUTPATIENT
Start: 2022-08-01 | End: 2022-10-06 | Stop reason: SDUPTHER

## 2022-08-01 RX ORDER — DICLOFENAC SODIUM 75 MG/1
75 TABLET, DELAYED RELEASE ORAL 2 TIMES DAILY
COMMUNITY
Start: 2022-07-13

## 2022-08-01 RX ADMIN — IOPAMIDOL 85 ML: 612 INJECTION, SOLUTION INTRAVENOUS at 16:25

## 2022-08-01 RX ADMIN — DIATRIZOATE MEGLUMINE AND DIATRIZOATE SODIUM 30 ML: 600; 100 SOLUTION ORAL; RECTAL at 16:26

## 2022-08-01 NOTE — ASSESSMENT & PLAN NOTE
UNCONTROLLED  - will start low dose wellbutrin for depression improvement  - has tried zoloft in the past but made her feel very emotionally flat  - has buspar for prn use with anxiety, had worked well for her previously in this manner but depression now more of an issue  - Discussed potential side effects of this medication with patient including insomnia, changes in sexual performance, weight and appetite changes, fatigue, nausea, dizziness. Reviewed that medication will likely not be fully beneficial for 3-4 weeks, so encouraged pt to give  medication a full month before making decision to change or stop med.

## 2022-08-01 NOTE — NURSING NOTE
1630   Arrived to radiology triage as stat hold and call.    1710   Spoke to Dr Fay who stated its ok for patient to go home and that their office will call her tomorrow.    1712  Advised patient to take antibiotics precscribed by Dr Fay per Dr Fay orders. Patient verbalized understanding.    1715  Patient directed to main lobby to exit.

## 2022-08-02 LAB
25(OH)D3+25(OH)D2 SERPL-MCNC: 22.6 NG/ML (ref 30–100)
ALBUMIN SERPL-MCNC: 4.9 G/DL (ref 3.8–4.8)
ALBUMIN/GLOB SERPL: 2 {RATIO} (ref 1.2–2.2)
ALP SERPL-CCNC: 62 IU/L (ref 44–121)
ALT SERPL-CCNC: 19 IU/L (ref 0–32)
AST SERPL-CCNC: 22 IU/L (ref 0–40)
BASOPHILS # BLD AUTO: 0.1 X10E3/UL (ref 0–0.2)
BASOPHILS NFR BLD AUTO: 2 %
BILIRUB SERPL-MCNC: 0.7 MG/DL (ref 0–1.2)
BUN SERPL-MCNC: 8 MG/DL (ref 6–20)
BUN/CREAT SERPL: 8 (ref 9–23)
CALCIUM SERPL-MCNC: 9.4 MG/DL (ref 8.7–10.2)
CHLORIDE SERPL-SCNC: 100 MMOL/L (ref 96–106)
CHOLEST SERPL-MCNC: 235 MG/DL (ref 100–199)
CO2 SERPL-SCNC: 21 MMOL/L (ref 20–29)
CREAT SERPL-MCNC: 0.95 MG/DL (ref 0.57–1)
EGFRCR SERPLBLD CKD-EPI 2021: 81 ML/MIN/1.73
EOSINOPHIL # BLD AUTO: 0.1 X10E3/UL (ref 0–0.4)
EOSINOPHIL NFR BLD AUTO: 2 %
ERYTHROCYTE [DISTWIDTH] IN BLOOD BY AUTOMATED COUNT: 12.4 % (ref 11.7–15.4)
GLOBULIN SER CALC-MCNC: 2.4 G/DL (ref 1.5–4.5)
GLUCOSE SERPL-MCNC: 107 MG/DL (ref 65–99)
HBA1C MFR BLD: 5.6 % (ref 4.8–5.6)
HCT VFR BLD AUTO: 42.5 % (ref 34–46.6)
HCV AB S/CO SERPL IA: <0.1 S/CO RATIO (ref 0–0.9)
HDLC SERPL-MCNC: 57 MG/DL
HGB BLD-MCNC: 14.4 G/DL (ref 11.1–15.9)
IMM GRANULOCYTES # BLD AUTO: 0 X10E3/UL (ref 0–0.1)
IMM GRANULOCYTES NFR BLD AUTO: 0 %
LDLC SERPL CALC-MCNC: 146 MG/DL (ref 0–99)
LYMPHOCYTES # BLD AUTO: 1.7 X10E3/UL (ref 0.7–3.1)
LYMPHOCYTES NFR BLD AUTO: 32 %
MCH RBC QN AUTO: 30.6 PG (ref 26.6–33)
MCHC RBC AUTO-ENTMCNC: 33.9 G/DL (ref 31.5–35.7)
MCV RBC AUTO: 90 FL (ref 79–97)
MONOCYTES # BLD AUTO: 0.4 X10E3/UL (ref 0.1–0.9)
MONOCYTES NFR BLD AUTO: 8 %
NEUTROPHILS # BLD AUTO: 3.1 X10E3/UL (ref 1.4–7)
NEUTROPHILS NFR BLD AUTO: 56 %
PLATELET # BLD AUTO: 306 X10E3/UL (ref 150–450)
POTASSIUM SERPL-SCNC: 4.1 MMOL/L (ref 3.5–5.2)
PROT SERPL-MCNC: 7.3 G/DL (ref 6–8.5)
RBC # BLD AUTO: 4.7 X10E6/UL (ref 3.77–5.28)
SODIUM SERPL-SCNC: 138 MMOL/L (ref 134–144)
TRIGL SERPL-MCNC: 177 MG/DL (ref 0–149)
TSH SERPL DL<=0.005 MIU/L-ACNC: 1.1 UIU/ML (ref 0.45–4.5)
VLDLC SERPL CALC-MCNC: 32 MG/DL (ref 5–40)
WBC # BLD AUTO: 5.5 X10E3/UL (ref 3.4–10.8)

## 2022-08-30 ENCOUNTER — LAB (OUTPATIENT)
Dept: LAB | Facility: HOSPITAL | Age: 32
End: 2022-08-30

## 2022-08-30 ENCOUNTER — OFFICE VISIT (OUTPATIENT)
Dept: GASTROENTEROLOGY | Facility: CLINIC | Age: 32
End: 2022-08-30

## 2022-08-30 VITALS
OXYGEN SATURATION: 99 % | DIASTOLIC BLOOD PRESSURE: 80 MMHG | WEIGHT: 189.1 LBS | HEART RATE: 73 BPM | HEIGHT: 62 IN | SYSTOLIC BLOOD PRESSURE: 118 MMHG | TEMPERATURE: 98.1 F | BODY MASS INDEX: 34.8 KG/M2

## 2022-08-30 DIAGNOSIS — K58.0 IRRITABLE BOWEL SYNDROME WITH DIARRHEA: Primary | ICD-10-CM

## 2022-08-30 DIAGNOSIS — N83.202 CYST OF LEFT OVARY: ICD-10-CM

## 2022-08-30 DIAGNOSIS — K57.30 DIVERTICULOSIS OF COLON: ICD-10-CM

## 2022-08-30 DIAGNOSIS — K58.0 IRRITABLE BOWEL SYNDROME WITH DIARRHEA: ICD-10-CM

## 2022-08-30 DIAGNOSIS — R10.32 LEFT LOWER QUADRANT ABDOMINAL PAIN: ICD-10-CM

## 2022-08-30 DIAGNOSIS — R10.32 LEFT LOWER QUADRANT ABDOMINAL PAIN: Primary | ICD-10-CM

## 2022-08-30 DIAGNOSIS — K57.92 DIVERTICULITIS: ICD-10-CM

## 2022-08-30 PROCEDURE — 86231 EMA EACH IG CLASS: CPT | Performed by: INTERNAL MEDICINE

## 2022-08-30 PROCEDURE — 99204 OFFICE O/P NEW MOD 45 MIN: CPT | Performed by: INTERNAL MEDICINE

## 2022-08-30 PROCEDURE — 82784 ASSAY IGA/IGD/IGG/IGM EACH: CPT | Performed by: INTERNAL MEDICINE

## 2022-08-30 PROCEDURE — 86364 TISS TRNSGLTMNASE EA IG CLAS: CPT | Performed by: INTERNAL MEDICINE

## 2022-08-30 PROCEDURE — 36415 COLL VENOUS BLD VENIPUNCTURE: CPT | Performed by: INTERNAL MEDICINE

## 2022-08-30 NOTE — PATIENT INSTRUCTIONS
Low-FODMAP Eating Plan    FODMAP stands for fermentable oligosaccharides, disaccharides, monosaccharides, and polyols. These are sugars that are hard for some people to digest. A low-FODMAP eating plan may help some people who have irritable bowel syndrome (IBS) and certain other bowel (intestinal) diseases to manage their symptoms.  This meal plan can be complicated to follow. Work with a diet and nutrition specialist (dietitian) to make a low-FODMAP eating plan that is right for you. A dietitian can help make sure that you get enough nutrition from this diet.  What are tips for following this plan?  Reading food labels  Check labels for hidden FODMAPs such as:  High-fructose syrup.  Honey.  Agave.  Natural fruit flavors.  Onion or garlic powder.  Choose low-FODMAP foods that contain 3-4 grams of fiber per serving.  Check food labels for serving sizes. Eat only one serving at a time to make sure FODMAP levels stay low.  Shopping  Shop with a list of foods that are recommended on this diet and make a meal plan.  Meal planning  Follow a low-FODMAP eating plan for up to 6 weeks, or as told by your health care provider or dietitian.  To follow the eating plan:  Eliminate high-FODMAP foods from your diet completely. Choose only low-FODMAP foods to eat. You will do this for 2-6 weeks.  Gradually reintroduce high-FODMAP foods into your diet one at a time. Most people should wait a few days before introducing the next new high-FODMAP food into their meal plan. Your dietitian can recommend how quickly you may reintroduce foods.  Keep a daily record of what and how much you eat and drink. Make note of any symptoms that you have after eating.  Review your daily record with a dietitian regularly to identify which foods you can eat and which foods you should avoid.  General tips  Drink enough fluid each day to keep your urine pale yellow.  Avoid processed foods. These often have added sugar and may be high in FODMAPs.  Avoid  "most dairy products, whole grains, and sweeteners.  Work with a dietitian to make sure you get enough fiber in your diet.  Avoid high FODMAP foods at meals to manage symptoms.  Recommended foods  Fruits  Bananas, oranges, tangerines, kylah, limes, blueberries, raspberries, strawberries, grapes, cantaloupe, honeydew melon, kiwi, papaya, passion fruit, and pineapple. Limited amounts of dried cranberries, banana chips, and shredded coconut.  Vegetables  Eggplant, zucchini, cucumber, peppers, green beans, bean sprouts, lettuce, arugula, kale, Swiss chard, spinach, jose greens, bok angelita, summer squash, potato, and tomato. Limited amounts of corn, carrot, and sweet potato. Green parts of scallions.  Grains  Gluten-free grains, such as rice, oats, buckwheat, quinoa, corn, polenta, and millet. Gluten-free pasta, bread, or cereal. Rice noodles. Corn tortillas.  Meats and other proteins  Unseasoned beef, pork, poultry, or fish. Eggs. Veronica. Tofu (firm) and tempeh. Limited amounts of nuts and seeds, such as almonds, walnuts, brazil nuts, pecans, peanuts, nut butters, pumpkin seeds, jem seeds, and sunflower seeds.  Dairy  Lactose-free milk, yogurt, and kefir. Lactose-free cottage cheese and ice cream. Non-dairy milks, such as almond, coconut, hemp, and rice milk. Non-dairy yogurt. Limited amounts of goat cheese, brie, mozzarella, parmesan, swiss, and other hard cheeses.  Fats and oils  Butter-free spreads. Vegetable oils, such as olive, canola, and sunflower oil.  Seasoning and other foods  Artificial sweeteners with names that do not end in \"ol,\" such as aspartame, saccharine, and stevia. Maple syrup, white table sugar, raw sugar, brown sugar, and molasses. Mayonnaise, soy sauce, and tamari. Fresh basil, coriander, parsley, rosemary, and thyme.  Beverages  Water and mineral water. Sugar-sweetened soft drinks. Small amounts of orange juice or cranberry juice. Black and green tea. Most dry mitul. Coffee.  The items listed " above may not be a complete list of foods and beverages you can eat. Contact a dietitian for more information.  Foods to avoid  Fruits  Fresh, dried, and juiced forms of apple, pear, watermelon, peach, plum, cherries, apricots, blackberries, boysenberries, figs, nectarines, and vika. Avocado.  Vegetables  Chicory root, artichoke, asparagus, cabbage, snow peas, [place] sprouts, broccoli, sugar snap peas, mushrooms, celery, and cauliflower. Onions, garlic, leeks, and the white part of scallions.  Grains  Wheat, including kamut, durum, and semolina. Barley and bulgur. Couscous. Wheat-based cereals. Wheat noodles, bread, crackers, and pastries.  Meats and other proteins  Fried or fatty meat. Sausage. Cashews and pistachios. Soybeans, baked beans, black beans, chickpeas, kidney beans, dale beans, navy beans, lentils, black-eyed peas, and split peas.  Dairy  Milk, yogurt, ice cream, and soft cheese. Cream and sour cream. Milk-based sauces. Custard. Buttermilk. Soy milk.  Seasoning and other foods  Any sugar-free gum or candy. Foods that contain artificial sweeteners such as sorbitol, mannitol, isomalt, or xylitol. Foods that contain honey, high-fructose corn syrup, or agave. Bouillon, vegetable stock, beef stock, and chicken stock. Garlic and onion powder. Condiments made with onion, such as hummus, chutney, pickles, relish, salad dressing, and salsa. Tomato paste.  Beverages  Chicory-based drinks. Coffee substitutes. Chamomile tea. Fennel tea. Sweet or fortified mitul such as port or brittany. Diet soft drinks made with isomalt, mannitol, maltitol, sorbitol, or xylitol. Apple, pear, and vika juice. Juices with high-fructose corn syrup.  The items listed above may not be a complete list of foods and beverages you should avoid. Contact a dietitian for more information.  Summary  FODMAP stands for fermentable oligosaccharides, disaccharides, monosaccharides, and polyols. These are sugars that are hard for some people to  digest.  A low-FODMAP eating plan is a short-term diet that helps to ease symptoms of certain bowel diseases.  The eating plan usually lasts up to 6 weeks. After that, high-FODMAP foods are reintroduced gradually and one at a time. This can help you find out which foods may be causing symptoms.  A low-FODMAP eating plan can be complicated. It is best to work with a dietitian who has experience with this type of plan.  This information is not intended to replace advice given to you by your health care provider. Make sure you discuss any questions you have with your health care provider.  Document Revised: 05/06/2021 Document Reviewed: 05/06/2021  Elsevier Patient Education © 2021 [employer] Inc[employer]

## 2022-08-30 NOTE — PROGRESS NOTES
No chief complaint on file.          History of Present Illness  Patient here for consultation regarding chronic and worsening left lower quadrant pain.  Patient also has history of IBS-D.  She currently takes dicyclomine.  Recent CT scan about one month ago showed evidence of diverticulitis as well as a left ovarian cyst. First episode of diverticulitis in 2017.  Pain improved on antibiotics. Also with bloating after bread or pasta. Also with occasional GERD.        Result Review :       CT Abdomen Pelvis With Contrast (08/01/2022 16:22)  Progress Notes by Cookie Fay MD (08/01/2022 10:30)  Hepatitis C Antibody (08/01/2022 11:08)  Comprehensive Metabolic Panel (08/01/2022 11:08)      Vital Signs:   There were no vitals taken for this visit.    There is no height or weight on file to calculate BMI.     Physical Exam  Vitals reviewed.   Constitutional:       Appearance: Normal appearance.   Abdominal:      General: Bowel sounds are normal. There is no distension.      Palpations: Abdomen is soft. Abdomen is not rigid. There is no mass or pulsatile mass.      Tenderness: There is no abdominal tenderness. There is no guarding or rebound.           Assessment and Plan    Diagnoses and all orders for this visit:    1. Left lower quadrant abdominal pain (Primary)    2. Diverticulosis of colon    3. Diverticulitis    4. Cyst of left ovary    5. Irritable bowel syndrome with diarrhea         BRIEF SUMMARY  Patient day for consultation regarding chronic and worsening left lower quadrant pain.  She has had multiple episodes of diverticulitis over the last 5 years.  She also has postprandial abdominal bloating with GERD and nausea.  We will proceed with an EGD and colonoscopy for further evaluation and will check biopsies of the small bowel to rule out celiac disease and of the colon to rule out microscopic colitis.  The meantime we will start a low FODMAP diet.    I have reviewed and confirmed the accuracy of the HPI  and Assessment and Plan as documented by the APRN Marty Eugene MD        Follow Up   No follow-ups on file.    There are no Patient Instructions on file for this visit.

## 2022-08-31 PROBLEM — R19.7 DIARRHEA: Status: ACTIVE | Noted: 2022-08-31

## 2022-08-31 PROBLEM — K21.9 GASTROESOPHAGEAL REFLUX DISEASE: Status: ACTIVE | Noted: 2022-08-31

## 2022-08-31 PROBLEM — R10.9 ABDOMINAL PAIN: Status: ACTIVE | Noted: 2022-08-31

## 2022-08-31 LAB
ENDOMYSIUM IGA SER QL: NEGATIVE
IGA SERPL-MCNC: 124 MG/DL (ref 87–352)
TTG IGA SER-ACNC: <2 U/ML (ref 0–3)

## 2022-10-03 ENCOUNTER — TELEPHONE (OUTPATIENT)
Dept: INTERNAL MEDICINE | Facility: CLINIC | Age: 32
End: 2022-10-03

## 2022-10-03 NOTE — TELEPHONE ENCOUNTER
Her appointment was rescheduled from today to Friday because bartolosharyn is out of the office, she is out of wellbutrin can you send this to [facility]

## 2022-10-03 NOTE — TELEPHONE ENCOUNTER
Caller: Jeanine Monteiro    Relationship to patient: Self    Best call back number: [phone]    Patient is needing: PT STATED THAT SHE IS SUPPOSED TO COME IN TODAY FOR APPT TO GET MED REFILLS. WANTS TO KNOW IF SHE CAN CANCEL APPT AND STILL GET THE REFILL AND THEN COME IN A FEW WEEKS WHEN IT IS BETTER FOR HER SCHEDULE. PLEASE ADVISE. HUB INFORMED PT THAT IF SHE DOES NOT GET RESPONSE PRIOR TO APPT TIME SHE WOULD NEED TO CALL AND R/S OR COME TO THE APPT TO AVOID IT COUNTING AS NO SHOW.

## 2022-10-06 DIAGNOSIS — F41.9 ANXIETY AND DEPRESSION: ICD-10-CM

## 2022-10-06 DIAGNOSIS — F32.A ANXIETY AND DEPRESSION: ICD-10-CM

## 2022-10-06 RX ORDER — BUPROPION HYDROCHLORIDE 150 MG/1
150 TABLET ORAL DAILY
Qty: 30 TABLET | Refills: 1 | Status: SHIPPED | OUTPATIENT
Start: 2022-10-06 | End: 2022-12-19 | Stop reason: SDUPTHER

## 2022-10-06 NOTE — TELEPHONE ENCOUNTER
Rx Refill Note  Requested Prescriptions     Pending Prescriptions Disp Refills   • buPROPion XL (Wellbutrin XL) 150 MG 24 hr tablet 30 tablet 1     Sig: Take 1 tablet by mouth Daily.      Last office visit with prescribing clinician: 8/1/2022      Next office visit with prescribing clinician: 10/7/2022            Shirley Law MA  10/06/22, 08:58 EDT

## 2022-10-07 ENCOUNTER — OFFICE VISIT (OUTPATIENT)
Dept: INTERNAL MEDICINE | Facility: CLINIC | Age: 32
End: 2022-10-07

## 2022-10-07 VITALS
BODY MASS INDEX: 35.51 KG/M2 | HEIGHT: 62 IN | TEMPERATURE: 98.3 F | SYSTOLIC BLOOD PRESSURE: 122 MMHG | DIASTOLIC BLOOD PRESSURE: 80 MMHG | RESPIRATION RATE: 16 BRPM | HEART RATE: 131 BPM | OXYGEN SATURATION: 96 % | WEIGHT: 193 LBS

## 2022-10-07 DIAGNOSIS — F41.9 ANXIETY AND DEPRESSION: Primary | Chronic | ICD-10-CM

## 2022-10-07 DIAGNOSIS — F32.A ANXIETY AND DEPRESSION: Primary | Chronic | ICD-10-CM

## 2022-10-07 PROCEDURE — 99214 OFFICE O/P EST MOD 30 MIN: CPT | Performed by: INTERNAL MEDICINE

## 2022-10-07 RX ORDER — LORAZEPAM 0.5 MG/1
TABLET ORAL
Qty: 10 TABLET | Refills: 0 | Status: SHIPPED | OUTPATIENT
Start: 2022-10-07 | End: 2022-11-04 | Stop reason: SDUPTHER

## 2022-10-07 RX ORDER — OLANZAPINE 2.5 MG/1
2.5 TABLET ORAL NIGHTLY
Qty: 60 TABLET | Refills: 1 | Status: SHIPPED | OUTPATIENT
Start: 2022-10-07 | End: 2022-11-18 | Stop reason: SINTOL

## 2022-10-07 NOTE — PROGRESS NOTES
"Chief Complaint  Follow-up, Depression, Anxiety, and Abdominal Pain    Subjective          Jeanine Monteiro presents to [facility] for follow up on depression, anxiety, and abdominal pain.   She has been seen by GI since her last appt and was placed on FODMAPS diet to help identify triggers and she has a colonascopy planned next week.   She was started on wellbutrin at her last appt but notes a lot has changed since she was last seen. Had felt like the low dose was helping some but last week had a couple days that she was out and things have started to spiral. Her anxiety seems to be worse and she has been having to use her buspar a lot more, had been rarely using 0.5 tabs every now and then, now taking 2 per day essentially every day and still struggling with anxiety.     Objective   Vital Signs:     /80   Pulse (!) 131   Temp 98.3 °F (36.8 °C)   Resp 16   Ht 157.5 cm (62\")   Wt 87.5 kg (193 lb)   SpO2 96%   BMI 35.30 kg/m²     Physical Exam  Vitals and nursing note reviewed.   Constitutional:       General: She is not in acute distress.     Appearance: Normal appearance.   Pulmonary:      Effort: Pulmonary effort is normal. No respiratory distress.   Neurological:      Mental Status: She is alert and oriented to person, place, and time. Mental status is at baseline.   Psychiatric:         Mood and Affect: Mood is anxious and depressed. Affect is tearful.         Speech: Speech is rapid and pressured and tangential.         Behavior: Behavior is hyperactive.         Thought Content: Thought content normal. Thought content does not include suicidal ideation. Thought content does not include suicidal plan.         Cognition and Memory: Cognition is impaired.         Judgment: Judgment is impulsive.          Result Review : : None     Assessment and Plan      Diagnoses and all orders for this visit:    1. Anxiety and depression (Primary)  Assessment & " Plan:  UNCONTROLLED  - some concerns for some manic symptoms like excessive alertness, increased spending, promiscuous behavior over past several days and feels she has had these episodes in the past as well  - will stop wellbutrin and have pt transition to zyprexa (has tried abilify in the past and did not tolerate)   - start with 2.5 mg nightly and increase to 5 mg nightly in 2 weeks as tolerated  - schedule Buspar 15 mg BID rather than using prn  - Reviewed potential side effects of medication including sexual performance changes, insomnia, fatigue, weight changes  - will agree to handful of BDZ for use in extreme panic situations, do not intend for this to become a chronic medication with multiple refills, but do believe it will be helpful in the short term while transitioning medications while her symptoms are very uncontrolled   - Reviewed controlled nature of substance, potential for abuse/addiction, and possible adverse  effects of medication. No red flags for abuse at this time.    - Brad checked and appropriate.           Orders:  -     OLANZapine (ZyPREXA) 2.5 MG tablet; Take 1 tablet by mouth Every Night.  Dispense: 60 tablet; Refill: 1  -     LORazepam (Ativan) 0.5 MG tablet; Take 1/2-1 tab at onset of panic attack  Dispense: 10 tablet; Refill: 0    Follow Up   Return in about 3 weeks (around 10/28/2022) for follow up.    Patient was given instructions and counseling regarding her condition or for health maintenance advice. Please see specific information pulled into the AVS if appropriate.     Rani Fay MD  [facility] [place] [facility] [place] [facility]  Phone: [phone]  Fax: [phone]

## 2022-10-07 NOTE — ASSESSMENT & PLAN NOTE
UNCONTROLLED  - some concerns for some manic symptoms like excessive alertness, increased spending, promiscuous behavior over past several days and feels she has had these episodes in the past as well  - will stop wellbutrin and have pt transition to zyprexa (has tried abilify in the past and did not tolerate)   - start with 2.5 mg nightly and increase to 5 mg nightly in 2 weeks as tolerated  - schedule Buspar 15 mg BID rather than using prn  - Reviewed potential side effects of medication including sexual performance changes, insomnia, fatigue, weight changes  - will agree to handful of BDZ for use in extreme panic situations, do not intend for this to become a chronic medication with multiple refills, but do believe it will be helpful in the short term while transitioning medications while her symptoms are very uncontrolled   - Reviewed controlled nature of substance, potential for abuse/addiction, and possible adverse  effects of medication. No red flags for abuse at this time.    - Brad checked and appropriate.

## 2022-10-11 ENCOUNTER — ANESTHESIA (OUTPATIENT)
Dept: SURGERY | Facility: SURGERY CENTER | Age: 32
End: 2022-10-11

## 2022-10-11 ENCOUNTER — HOSPITAL ENCOUNTER (OUTPATIENT)
Facility: SURGERY CENTER | Age: 32
Setting detail: HOSPITAL OUTPATIENT SURGERY
Discharge: HOME | End: 2022-10-11
Attending: INTERNAL MEDICINE | Admitting: INTERNAL MEDICINE

## 2022-10-11 ENCOUNTER — ANESTHESIA EVENT (OUTPATIENT)
Dept: SURGERY | Facility: SURGERY CENTER | Age: 32
End: 2022-10-11

## 2022-10-11 VITALS
WEIGHT: 192 LBS | HEIGHT: 62 IN | RESPIRATION RATE: 16 BRPM | BODY MASS INDEX: 35.33 KG/M2 | HEART RATE: 66 BPM | TEMPERATURE: 98.2 F | OXYGEN SATURATION: 98 % | SYSTOLIC BLOOD PRESSURE: 111 MMHG | DIASTOLIC BLOOD PRESSURE: 92 MMHG

## 2022-10-11 DIAGNOSIS — K21.9 GASTROESOPHAGEAL REFLUX DISEASE, UNSPECIFIED WHETHER ESOPHAGITIS PRESENT: ICD-10-CM

## 2022-10-11 DIAGNOSIS — R10.9 ABDOMINAL PAIN, UNSPECIFIED ABDOMINAL LOCATION: ICD-10-CM

## 2022-10-11 DIAGNOSIS — R19.7 DIARRHEA, UNSPECIFIED TYPE: ICD-10-CM

## 2022-10-11 LAB
B-HCG UR QL: NEGATIVE
EXPIRATION DATE: NORMAL
INTERNAL NEGATIVE CONTROL: NEGATIVE
INTERNAL POSITIVE CONTROL: POSITIVE
Lab: NORMAL

## 2022-10-11 PROCEDURE — 43239 EGD BIOPSY SINGLE/MULTIPLE: CPT | Performed by: INTERNAL MEDICINE

## 2022-10-11 PROCEDURE — 88305 TISSUE EXAM BY PATHOLOGIST: CPT | Performed by: INTERNAL MEDICINE

## 2022-10-11 PROCEDURE — 25010000002 PROPOFOL 10 MG/ML EMULSION: Performed by: ANESTHESIOLOGY

## 2022-10-11 PROCEDURE — 81025 URINE PREGNANCY TEST: CPT | Performed by: INTERNAL MEDICINE

## 2022-10-11 PROCEDURE — 45380 COLONOSCOPY AND BIOPSY: CPT | Performed by: INTERNAL MEDICINE

## 2022-10-11 RX ORDER — PROPOFOL 10 MG/ML
VIAL (ML) INTRAVENOUS AS NEEDED
Status: DISCONTINUED | OUTPATIENT
Start: 2022-10-11 | End: 2022-10-11 | Stop reason: SURG

## 2022-10-11 RX ORDER — SODIUM CHLORIDE, SODIUM LACTATE, POTASSIUM CHLORIDE, CALCIUM CHLORIDE 600; 310; 30; 20 MG/100ML; MG/100ML; MG/100ML; MG/100ML
30 INJECTION, SOLUTION INTRAVENOUS CONTINUOUS PRN
Status: DISCONTINUED | OUTPATIENT
Start: 2022-10-11 | End: 2022-10-11 | Stop reason: HOSPADM

## 2022-10-11 RX ORDER — SODIUM CHLORIDE 0.9 % (FLUSH) 0.9 %
3 SYRINGE (ML) INJECTION EVERY 12 HOURS SCHEDULED
Status: DISCONTINUED | OUTPATIENT
Start: 2022-10-11 | End: 2022-10-11 | Stop reason: HOSPADM

## 2022-10-11 RX ORDER — SODIUM CHLORIDE 0.9 % (FLUSH) 0.9 %
10 SYRINGE (ML) INJECTION AS NEEDED
Status: DISCONTINUED | OUTPATIENT
Start: 2022-10-11 | End: 2022-10-11 | Stop reason: HOSPADM

## 2022-10-11 RX ORDER — LIDOCAINE HYDROCHLORIDE 20 MG/ML
INJECTION, SOLUTION INFILTRATION; PERINEURAL AS NEEDED
Status: DISCONTINUED | OUTPATIENT
Start: 2022-10-11 | End: 2022-10-11 | Stop reason: SURG

## 2022-10-11 RX ADMIN — PROPOFOL 140 MCG/KG/MIN: 10 INJECTION, EMULSION INTRAVENOUS at 13:12

## 2022-10-11 RX ADMIN — LIDOCAINE HYDROCHLORIDE 40 MG: 20 INJECTION, SOLUTION INFILTRATION; PERINEURAL at 13:12

## 2022-10-11 RX ADMIN — SODIUM CHLORIDE, SODIUM LACTATE, POTASSIUM CHLORIDE, AND CALCIUM CHLORIDE: .6; .31; .03; .02 INJECTION, SOLUTION INTRAVENOUS at 13:08

## 2022-10-11 RX ADMIN — PROPOFOL 100 MG: 10 INJECTION, EMULSION INTRAVENOUS at 13:12

## 2022-10-11 RX ADMIN — SODIUM CHLORIDE, POTASSIUM CHLORIDE, SODIUM LACTATE AND CALCIUM CHLORIDE 30 ML/HR: 600; 310; 30; 20 INJECTION, SOLUTION INTRAVENOUS at 12:17

## 2022-10-11 NOTE — ANESTHESIA PREPROCEDURE EVALUATION
Anesthesia Evaluation     Patient summary reviewed and Nursing notes reviewed   NPO Solid Status: > 8 hours  NPO Liquid Status: > 2 hours           Airway   Mallampati: II  TM distance: >3 FB  Neck ROM: full  No difficulty expected  Dental - normal exam     Pulmonary - normal exam   (+) asthma,  Cardiovascular - negative cardio ROS and normal exam  Exercise tolerance: good (4-7 METS)        Neuro/Psych  (+) psychiatric history Anxiety,    GI/Hepatic/Renal/Endo    (+)  GERD,      Musculoskeletal (-) negative ROS    Abdominal    Substance History - negative use     OB/GYN negative ob/gyn ROS         Other                        Anesthesia Plan    ASA 2     MAC     intravenous induction     Anesthetic plan, risks, benefits, and alternatives have been provided, discussed and informed consent has been obtained with: patient.        CODE STATUS:

## 2022-10-11 NOTE — ANESTHESIA POSTPROCEDURE EVALUATION
"Patient: Jeanine Monteiro    Procedure Summary     Date: 10/11/22 Room / Location: [facility]    Anesthesia Start: 1308 Anesthesia Stop: 1344    Procedures:       ESOPHAGOGASTRODUODENOSCOPY      COLONOSCOPY Diagnosis:       Gastroesophageal reflux disease, unspecified whether esophagitis present      Diarrhea, unspecified type      Abdominal pain, unspecified abdominal location      (Gastroesophageal reflux disease, unspecified whether esophagitis present [K21.9])      (Diarrhea, unspecified type [R19.7])      (Abdominal pain, unspecified abdominal location [R10.9])    Surgeons: Marty Eugene MD Provider: Hoang Rai MD    Anesthesia Type: MAC ASA Status: 2          Anesthesia Type: MAC    Vitals  Vitals Value Taken Time   /84 10/11/22 1346   Temp     Pulse 70 10/11/22 1346   Resp     SpO2 98 % 10/11/22 1346   Vitals shown include unvalidated device data.        Post Anesthesia Care and Evaluation    Patient location during evaluation: bedside  Patient participation: complete - patient participated  Level of consciousness: awake and alert  Pain management: adequate    Airway patency: patent  Anesthetic complications: No anesthetic complications  PONV Status: controlled  Cardiovascular status: acceptable  Respiratory status: acceptable  Hydration status: acceptable    Comments: /83 (BP Location: Left arm, Patient Position: Lying)   Pulse 88   Temp 36.7 °C (98 °F) (Oral)   Resp 16   Ht 157.5 cm (62\")   Wt 87.1 kg (192 lb)   LMP 09/22/2022   SpO2 97%   BMI 35.12 kg/m²         "

## 2022-10-13 LAB
LAB AP CASE REPORT: NORMAL
LAB AP DIAGNOSIS COMMENT: NORMAL
PATH REPORT.FINAL DX SPEC: NORMAL
PATH REPORT.GROSS SPEC: NORMAL

## 2022-10-20 DIAGNOSIS — K21.00 GASTROESOPHAGEAL REFLUX DISEASE WITH ESOPHAGITIS WITHOUT HEMORRHAGE: Primary | ICD-10-CM

## 2022-10-20 RX ORDER — PANTOPRAZOLE SODIUM 40 MG/1
40 TABLET, DELAYED RELEASE ORAL DAILY
Qty: 90 TABLET | Refills: 3 | Status: SHIPPED | OUTPATIENT
Start: 2022-10-20

## 2022-11-04 ENCOUNTER — OFFICE VISIT (OUTPATIENT)
Dept: INTERNAL MEDICINE | Facility: CLINIC | Age: 32
End: 2022-11-04

## 2022-11-04 VITALS
OXYGEN SATURATION: 100 % | HEART RATE: 92 BPM | HEIGHT: 62 IN | DIASTOLIC BLOOD PRESSURE: 80 MMHG | BODY MASS INDEX: 35.7 KG/M2 | SYSTOLIC BLOOD PRESSURE: 120 MMHG | WEIGHT: 194 LBS | TEMPERATURE: 97.1 F | RESPIRATION RATE: 16 BRPM

## 2022-11-04 DIAGNOSIS — F41.9 ANXIETY AND DEPRESSION: Primary | Chronic | ICD-10-CM

## 2022-11-04 DIAGNOSIS — F32.A ANXIETY AND DEPRESSION: Primary | Chronic | ICD-10-CM

## 2022-11-04 PROCEDURE — 99214 OFFICE O/P EST MOD 30 MIN: CPT | Performed by: INTERNAL MEDICINE

## 2022-11-04 RX ORDER — LAMOTRIGINE 25 MG/1
25 TABLET ORAL DAILY
Qty: 30 TABLET | Refills: 0 | Status: SHIPPED | OUTPATIENT
Start: 2022-11-04 | End: 2023-01-20 | Stop reason: SINTOL

## 2022-11-04 RX ORDER — LORAZEPAM 0.5 MG/1
0.5 TABLET ORAL 2 TIMES DAILY PRN
Qty: 30 TABLET | Refills: 0 | Status: SHIPPED | OUTPATIENT
Start: 2022-11-04

## 2022-11-04 NOTE — PROGRESS NOTES
"Chief Complaint  Follow-up, Anxiety, and Depression    Subjective          Jeanine Monteiro presents to [facility] for follow up on anxiety and depression. At last appt, tried to change over to zyprexa but had some facial swelling and rash with vision changes and worsening of her asthma. She stopped the zyprexa within 2 weeks of starting this medication with resolution of symptoms over coming week. She restarted her wellbutrin just to be on something and is still taking this now, but does feel like overall she is better now than she was. She is taking the buspar BID as planned. She was given a prn Rx for ativan and notes she ended up going through this Rx in 2 weeks.   She is very worried about side effects to medications.     Objective   Vital Signs:     /80   Pulse 92   Temp 97.1 °F (36.2 °C)   Resp 16   Ht 157.5 cm (62\")   Wt 88 kg (194 lb)   SpO2 100%   BMI 35.48 kg/m²     Physical Exam  Vitals and nursing note reviewed.   Constitutional:       General: She is not in acute distress.     Appearance: Normal appearance.   Pulmonary:      Effort: Pulmonary effort is normal. No respiratory distress.   Neurological:      Mental Status: She is alert and oriented to person, place, and time. Mental status is at baseline.   Psychiatric:         Mood and Affect: Mood is anxious. Affect is not tearful.         Speech: Speech normal.         Behavior: Behavior is hyperactive.         Thought Content: Thought content normal. Thought content does not include suicidal ideation.         Cognition and Memory: Cognition normal.         Judgment: Judgment normal.          Result Review : : None     Assessment and Plan      Diagnoses and all orders for this visit:    1. Anxiety and depression (Primary)  Assessment & Plan:  IMPROVED  - does feel like she is more stable now then the last time she was seen, but does not believe this has anything to do with medication " changes  - back on wellbutrin 300 mg currently  - cont Buspar 15 mg BID  - will add low dose lamictal to start working on mood stabilization effects, some concerns over manic or hypomanic spells so want to be cautious about unopposed SSRI type action  - genesight testing sent off given strong side effects she has experienced with antipsychotic medications  - Reviewed potential side effects of medication including sexual performance changes, insomnia, fatigue, weight changes  - will refill a handful of BDZ for use in extreme panic situations, stressed that this will not become a chronic medication with multiple refills, but do believe it will be helpful in the short term while transitioning medications while her symptoms are very uncontrolled   - Reviewed controlled nature of substance, potential for abuse/addiction, and possible adverse  effects of medication. No red flags for abuse at this time.    - Brad checked and appropriate.           Orders:  -     lamoTRIgine (LaMICtal) 25 MG tablet; Take 1 tablet by mouth Daily.  Dispense: 30 tablet; Refill: 0  -     LORazepam (Ativan) 0.5 MG tablet; Take 1 tablet by mouth 2 (Two) Times a Day As Needed for Anxiety.  Dispense: 30 tablet; Refill: 0        Follow Up   Return in about 4 weeks (around 12/2/2022) for follow up anxiety.    Patient was given instructions and counseling regarding her condition or for health maintenance advice. Please see specific information pulled into the AVS if appropriate.     Rani Fay MD  [facility] [place] [facility] [place] [facility]  Phone: [phone]  Fax: [phone]

## 2022-11-04 NOTE — ASSESSMENT & PLAN NOTE
IMPROVED  - does feel like she is more stable now then the last time she was seen, but does not believe this has anything to do with medication changes  - back on wellbutrin 300 mg currently  - cont Buspar 15 mg BID  - will add low dose lamictal to start working on mood stabilization effects, some concerns over manic or hypomanic spells so want to be cautious about unopposed SSRI type action  - genesight testing sent off given strong side effects she has experienced with antipsychotic medications  - Reviewed potential side effects of medication including sexual performance changes, insomnia, fatigue, weight changes  - will refill a handful of BDZ for use in extreme panic situations, stressed that this will not become a chronic medication with multiple refills, but do believe it will be helpful in the short term while transitioning medications while her symptoms are very uncontrolled   - Reviewed controlled nature of substance, potential for abuse/addiction, and possible adverse  effects of medication. No red flags for abuse at this time.    - Brad checked and appropriate.

## 2022-11-18 ENCOUNTER — OFFICE VISIT (OUTPATIENT)
Dept: INTERNAL MEDICINE | Facility: CLINIC | Age: 32
End: 2022-11-18

## 2022-11-18 VITALS
HEART RATE: 63 BPM | DIASTOLIC BLOOD PRESSURE: 80 MMHG | HEIGHT: 62 IN | BODY MASS INDEX: 35.88 KG/M2 | RESPIRATION RATE: 16 BRPM | SYSTOLIC BLOOD PRESSURE: 114 MMHG | WEIGHT: 195 LBS | TEMPERATURE: 96.6 F | OXYGEN SATURATION: 99 %

## 2022-11-18 DIAGNOSIS — F41.9 ANXIETY AND DEPRESSION: Primary | ICD-10-CM

## 2022-11-18 DIAGNOSIS — F32.A ANXIETY AND DEPRESSION: Primary | ICD-10-CM

## 2022-11-18 PROCEDURE — 99214 OFFICE O/P EST MOD 30 MIN: CPT | Performed by: INTERNAL MEDICINE

## 2022-11-18 NOTE — PROGRESS NOTES
"Chief Complaint  Follow-up, Anxiety, and Depression    Subjective          Jeanine Monteiro presents to [facility] for follow up on anxiety and depression.   At her last appt she was started on low dose lamictal in addition to her wellbutrin that she had restarted. She stopped this after 1 week because she was more irritable and angry similar to how she had felt on the abilify.     Objective   Vital Signs:     /80   Pulse 63   Temp 96.6 °F (35.9 °C)   Resp 16   Ht 157.5 cm (62\")   Wt 88.5 kg (195 lb)   SpO2 99%   BMI 35.67 kg/m²     Physical Exam  Vitals and nursing note reviewed.   Constitutional:       General: She is not in acute distress.     Appearance: Normal appearance.   Pulmonary:      Effort: Pulmonary effort is normal. No respiratory distress.   Neurological:      Mental Status: She is alert and oriented to person, place, and time. Mental status is at baseline.   Psychiatric:         Mood and Affect: Mood is anxious. Mood is not depressed. Affect is not tearful.         Speech: Speech normal.         Behavior: Behavior is hyperactive. Behavior is cooperative.         Thought Content: Thought content normal.         Cognition and Memory: Cognition normal.         Judgment: Judgment normal.          Result Review :   The following data was reviewed by: Cookie Fay MD on 11/18/2022:    Data reviewed: GeneSight Testing            Assessment and Plan      Diagnoses and all orders for this visit:    1. Anxiety and depression (Primary)  Assessment & Plan:  STABLE  - does feel like she is more stable now then the last time she was seen, but does not believe this has anything to do with medication changes  - will start pt on low dose vraylar based on genesight testing results  - back on wellbutrin 300 mg currently  - cont Buspar 15 mg BID  - Reviewed potential side effects of medication including sexual performance changes, insomnia, fatigue, " weight changes  - has handful of BDZ for prn use in severe situations with panic attacks, not using routinely, does not need refill at this time   - Reviewed controlled nature of substance, potential for abuse/addiction, and possible adverse effects of medication. No red flags for abuse at this time.    - Brad checked and appropriate.     Previous Meds Tried: sertraline (weight gain), lamictal (irritability), abilify (irritabilty), zyprexa (rash)    Orders:  -     Cariprazine HCl (VRAYLAR) 1.5 MG capsule capsule; Take 1 capsule by mouth Daily.  Dispense: 30 capsule; Refill: 0      Follow Up   Return in about 4 weeks (around 12/16/2022) for follow up anxiety/depression.    Patient was given instructions and counseling regarding her condition or for health maintenance advice. Please see specific information pulled into the AVS if appropriate.     Rani Fay MD  [facility] [place] [facility] [place] [facility]  Phone: [phone]  Fax: [phone]

## 2022-11-18 NOTE — ASSESSMENT & PLAN NOTE
STABLE  - does feel like she is more stable now then the last time she was seen, but does not believe this has anything to do with medication changes  - will start pt on low dose vraylar based on genesight testing results  - back on wellbutrin 300 mg currently  - cont Buspar 15 mg BID  - Reviewed potential side effects of medication including sexual performance changes, insomnia, fatigue, weight changes  - has handful of BDZ for prn use in severe situations with panic attacks, not using routinely, does not need refill at this time   - Reviewed controlled nature of substance, potential for abuse/addiction, and possible adverse effects of medication. No red flags for abuse at this time.    - Brad checked and appropriate.     Previous Meds Tried: sertraline (weight gain), lamictal (irritability), abilify (irritabilty), zyprexa (rash)

## 2022-12-16 ENCOUNTER — OFFICE VISIT (OUTPATIENT)
Dept: INTERNAL MEDICINE | Facility: CLINIC | Age: 32
End: 2022-12-16

## 2022-12-16 VITALS
BODY MASS INDEX: 36.44 KG/M2 | DIASTOLIC BLOOD PRESSURE: 76 MMHG | OXYGEN SATURATION: 99 % | HEART RATE: 93 BPM | WEIGHT: 198 LBS | TEMPERATURE: 97.3 F | RESPIRATION RATE: 16 BRPM | HEIGHT: 62 IN | SYSTOLIC BLOOD PRESSURE: 124 MMHG

## 2022-12-16 DIAGNOSIS — F41.9 ANXIETY AND DEPRESSION: ICD-10-CM

## 2022-12-16 DIAGNOSIS — F32.A ANXIETY AND DEPRESSION: ICD-10-CM

## 2022-12-16 PROCEDURE — 99214 OFFICE O/P EST MOD 30 MIN: CPT | Performed by: INTERNAL MEDICINE

## 2022-12-16 NOTE — ASSESSMENT & PLAN NOTE
STABLE  - does feel like she is more stable now then the last time she was seen, but does not believe this has anything to do with medication changes  - will increase vraylar to 3 mg for ongoing improvements, can increase further to 6 mg in 2-3 weeks based on response  - cont Buspar 15 mg BID and wellbutrin 300 mg daily  - Reviewed potential side effects of medication including sexual performance changes, insomnia, fatigue, weight changes  - has handful of BDZ for prn use in severe situations with panic attacks, not using routinely, does not need refill at this time   - Reviewed controlled nature of substance, potential for abuse/addiction, and possible adverse effects of medication. No red flags for abuse at this time.    - Brad checked and appropriate.     Previous Meds Tried: sertraline (weight gain), lamictal (irritability), abilify (irritabilty), zyprexa (rash)

## 2022-12-16 NOTE — PROGRESS NOTES
"Chief Complaint  Follow-up, Anxiety, and Depression    Subjective          Jeanine Monteiro presents to [facility] for follow up on anxiety and depression. Pt was seen 1 month ago and after review of genesight testing results she was started on vraylar. She has been overall doing ok, no side effects like she has had with other antipsychotic medications. She can tell her overall mood is improved and she does have more energy. She does feel like she gets a bit of a midday crash that she has not experienced before.     Objective   Vital Signs:     /76   Pulse 93   Temp 97.3 °F (36.3 °C)   Resp 16   Ht 157.5 cm (62\")   Wt 89.8 kg (198 lb)   SpO2 99%   BMI 36.21 kg/m²     Physical Exam  Vitals and nursing note reviewed.   Constitutional:       General: She is not in acute distress.     Appearance: Normal appearance.   Pulmonary:      Effort: Pulmonary effort is normal. No respiratory distress.   Neurological:      Mental Status: She is alert and oriented to person, place, and time. Mental status is at baseline.   Psychiatric:         Mood and Affect: Mood normal. Mood is not anxious or depressed. Affect is not flat or tearful.         Speech: Speech normal.         Behavior: Behavior normal. Behavior is cooperative.         Thought Content: Thought content normal.         Cognition and Memory: Cognition normal.         Judgment: Judgment normal.          Result Review : : none     Assessment and Plan      Diagnoses and all orders for this visit:    1. Anxiety and depression  Assessment & Plan:  STABLE  - does feel like she is more stable now then the last time she was seen, but does not believe this has anything to do with medication changes  - will increase vraylar to 3 mg for ongoing improvements, can increase further to 6 mg in 2-3 weeks based on response  - cont Buspar 15 mg BID and wellbutrin 300 mg daily  - Reviewed potential side effects of medication " including sexual performance changes, insomnia, fatigue, weight changes  - has handful of BDZ for prn use in severe situations with panic attacks, not using routinely, does not need refill at this time   - Reviewed controlled nature of substance, potential for abuse/addiction, and possible adverse effects of medication. No red flags for abuse at this time.    - Brad checked and appropriate.     Previous Meds Tried: sertraline (weight gain), lamictal (irritability), abilify (irritabilty), zyprexa (rash)    Orders:  -     Cariprazine HCl (VRAYLAR) 3 MG capsule capsule; Take 1 capsule by mouth Daily.  Dispense: 90 capsule; Refill: 0        Follow Up   Return in about 6 weeks (around 1/27/2023) for follow up anxiety/depression.    Patient was given instructions and counseling regarding her condition or for health maintenance advice. Please see specific information pulled into the AVS if appropriate.     Rani Fay MD  [facility] [place] [facility] [place] [facility]  Phone: [phone]  Fax: [phone]

## 2022-12-19 DIAGNOSIS — F32.A ANXIETY AND DEPRESSION: ICD-10-CM

## 2022-12-19 DIAGNOSIS — F41.9 ANXIETY AND DEPRESSION: ICD-10-CM

## 2022-12-19 RX ORDER — BUPROPION HYDROCHLORIDE 300 MG/1
300 TABLET ORAL DAILY
Qty: 90 TABLET | Refills: 1 | Status: SHIPPED | OUTPATIENT
Start: 2022-12-19 | End: 2023-03-31 | Stop reason: SDUPTHER

## 2022-12-19 NOTE — TELEPHONE ENCOUNTER
Rx Refill Note  Requested Prescriptions     Pending Prescriptions Disp Refills   • buPROPion XL (Wellbutrin XL) 150 MG 24 hr tablet 30 tablet 1     Sig: Take 1 tablet by mouth Daily.      Last office visit with prescribing clinician: 12/16/2022   Last telemedicine visit with prescribing clinician: 1/20/2023   Next office visit with prescribing clinician: 1/20/2023                         Would you like a call back once the refill request has been completed: [] Yes [] No    If the office needs to give you a call back, can they leave a voicemail: [] Yes [] No    Shirley Law MA  12/19/22, 12:52 EST

## 2022-12-19 NOTE — TELEPHONE ENCOUNTER
Caller: Thania Jeanine Bang    Relationship: Self    Best call back number: [phone]    Requested Prescriptions:   Requested Prescriptions     Pending Prescriptions Disp Refills   • buPROPion XL (Wellbutrin XL) 150 MG 24 hr tablet 30 tablet 1     Sig: Take 1 tablet by mouth Daily.        Pharmacy where request should be sent: [id number] [address] AT [facility] & [facility] [phone] [facility] [phone]      Additional details provided by patient: OUT OF MEDICATION    Does the patient have less than a 3 day supply:  [x] Yes  [] No    Would you like a call back once the refill request has been completed: [] Yes [x] No    If the office needs to give you a call back, can they leave a voicemail: [] Yes [x] No    James Chambers Rep   12/19/22 12:48 EST

## 2023-01-20 ENCOUNTER — OFFICE VISIT (OUTPATIENT)
Dept: INTERNAL MEDICINE | Facility: CLINIC | Age: 33
End: 2023-01-20
Payer: COMMERCIAL

## 2023-01-20 VITALS
HEIGHT: 62 IN | DIASTOLIC BLOOD PRESSURE: 84 MMHG | BODY MASS INDEX: 36.99 KG/M2 | WEIGHT: 201 LBS | TEMPERATURE: 96.4 F | SYSTOLIC BLOOD PRESSURE: 120 MMHG | RESPIRATION RATE: 16 BRPM | OXYGEN SATURATION: 97 % | HEART RATE: 89 BPM

## 2023-01-20 DIAGNOSIS — F32.A ANXIETY AND DEPRESSION: Primary | Chronic | ICD-10-CM

## 2023-01-20 DIAGNOSIS — F41.9 ANXIETY AND DEPRESSION: Primary | Chronic | ICD-10-CM

## 2023-01-20 PROCEDURE — 99214 OFFICE O/P EST MOD 30 MIN: CPT | Performed by: INTERNAL MEDICINE

## 2023-01-20 RX ORDER — BUSPIRONE HYDROCHLORIDE 15 MG/1
15 TABLET ORAL 3 TIMES DAILY
Qty: 270 TABLET | Refills: 1 | Status: SHIPPED | OUTPATIENT
Start: 2023-01-20

## 2023-01-20 NOTE — PROGRESS NOTES
"Chief Complaint  Follow-up, Anxiety, and Depression    Subjective          Jeanine Monteiro presents to [facility] for follow up on anxiety and depression. Dose of vraylar was increased at her last appt and when she got to the 3 mg dose it caused such extreme fatigue that she had to stop. She is currently taking only the wellbutrin and buspar and feels like for right now this is ok, not interested in any more medication trials right now.     Objective   Vital Signs:     /84   Pulse 89   Temp 96.4 °F (35.8 °C)   Resp 16   Ht 157.5 cm (62\")   Wt 91.2 kg (201 lb)   SpO2 97%   BMI 36.76 kg/m²     Physical Exam  Vitals and nursing note reviewed.   Constitutional:       General: She is not in acute distress.     Appearance: Normal appearance.   Pulmonary:      Effort: Pulmonary effort is normal. No respiratory distress.   Neurological:      Mental Status: She is alert and oriented to person, place, and time. Mental status is at baseline.   Psychiatric:         Attention and Perception: Attention normal.         Mood and Affect: Mood normal. Mood is not anxious or depressed. Affect is not tearful.         Speech: Speech normal.         Behavior: Behavior normal. Behavior is cooperative.         Thought Content: Thought content normal.         Cognition and Memory: Cognition normal.         Judgment: Judgment normal.          Result Review : : None     Assessment and Plan      Diagnoses and all orders for this visit:    1. Anxiety and depression (Primary)  Assessment & Plan:  STABLE  - cont Buspar 15 mg but will increase from BID to TID scheduled,  and wellbutrin 300 mg daily  - Reviewed potential side effects of medication including sexual performance changes, insomnia, fatigue, weight changes  - has handful of BDZ for prn use in severe situations with panic attacks, not using routinely, does not need refill at this time   - Reviewed controlled nature of " substance, potential for abuse/addiction, and possible adverse effects of medication. No red flags for abuse at this time.    - Brad checked and appropriate.   -next med trials could include: topamax, latuda, rexulti,     Previous Meds Tried: sertraline (weight gain), lamictal (irritability), abilify (irritabilty), zyprexa (rash), vraylar (fatigue)    Orders:  -     busPIRone (BUSPAR) 15 MG tablet; Take 1 tablet by mouth 3 (Three) Times a Day.  Dispense: 270 tablet; Refill: 1      Follow Up   Return in about 2 months (around 3/20/2023) for follow up anxiety/depression/bipolar.    Patient was given instructions and counseling regarding her condition or for health maintenance advice. Please see specific information pulled into the AVS if appropriate.     Rani Fay MD  [facility] [place] [facility]  Phone: [phone]  Fax: [phone]

## 2023-01-20 NOTE — ASSESSMENT & PLAN NOTE
STABLE  - cont Buspar 15 mg but will increase from BID to TID scheduled,  and wellbutrin 300 mg daily  - Reviewed potential side effects of medication including sexual performance changes, insomnia, fatigue, weight changes  - has handful of BDZ for prn use in severe situations with panic attacks, not using routinely, does not need refill at this time   - Reviewed controlled nature of substance, potential for abuse/addiction, and possible adverse effects of medication. No red flags for abuse at this time.    - Brad checked and appropriate.   -next med trials could include: topamax, latuda, rexulti,     Previous Meds Tried: sertraline (weight gain), lamictal (irritability), abilify (irritabilty), zyprexa (rash), vraylar (fatigue)

## 2023-02-02 ENCOUNTER — TELEPHONE (OUTPATIENT)
Dept: INTERNAL MEDICINE | Facility: CLINIC | Age: 33
End: 2023-02-02
Payer: COMMERCIAL

## 2023-02-02 NOTE — TELEPHONE ENCOUNTER
----- Message from Cookie Fay MD sent at 8/1/2022 10:14 PM EDT -----  Regarding: pelvis US  Follow up pelvis US for L ovarian cyst

## 2023-02-02 NOTE — TELEPHONE ENCOUNTER
She is due for a follow up US of her pelvic area to recheck the ovarian cyst on the L side we found back over the summer, where would she like me to set this up?

## 2023-02-07 DIAGNOSIS — J45.40 MODERATE PERSISTENT ALLERGIC ASTHMA: Chronic | ICD-10-CM

## 2023-02-07 RX ORDER — MONTELUKAST SODIUM 10 MG/1
10 TABLET ORAL NIGHTLY
Qty: 90 TABLET | Refills: 3 | Status: SHIPPED | OUTPATIENT
Start: 2023-02-07

## 2023-02-07 NOTE — TELEPHONE ENCOUNTER
Rx Refill Note  Requested Prescriptions     Pending Prescriptions Disp Refills   • montelukast (SINGULAIR) 10 MG tablet 90 tablet 3     Sig: Take 1 tablet by mouth Every Night.      Last office visit with prescribing clinician: 1/20/2023   Last telemedicine visit with prescribing clinician: 3/20/2023   Next office visit with prescribing clinician: 3/20/2023                         Would you like a call back once the refill request has been completed: [] Yes [] No    If the office needs to give you a call back, can they leave a voicemail: [] Yes [] No    Shirley Law MA  02/07/23, 14:48 EST

## 2023-03-31 ENCOUNTER — TELEMEDICINE (OUTPATIENT)
Dept: INTERNAL MEDICINE | Facility: CLINIC | Age: 33
End: 2023-03-31
Payer: COMMERCIAL

## 2023-03-31 DIAGNOSIS — F32.A ANXIETY AND DEPRESSION: ICD-10-CM

## 2023-03-31 DIAGNOSIS — F41.9 ANXIETY AND DEPRESSION: ICD-10-CM

## 2023-03-31 DIAGNOSIS — J45.40 MODERATE PERSISTENT ALLERGIC ASTHMA: Chronic | ICD-10-CM

## 2023-03-31 RX ORDER — FLUTICASONE FUROATE AND VILANTEROL 100; 25 UG/1; UG/1
1 POWDER RESPIRATORY (INHALATION)
Qty: 60 EACH | Refills: 3 | Status: SHIPPED | OUTPATIENT
Start: 2023-03-31

## 2023-03-31 RX ORDER — BUPROPION HYDROCHLORIDE 300 MG/1
300 TABLET ORAL DAILY
Qty: 90 TABLET | Refills: 1 | Status: SHIPPED | OUTPATIENT
Start: 2023-03-31

## 2023-03-31 RX ORDER — ALBUTEROL SULFATE 90 UG/1
2 AEROSOL, METERED RESPIRATORY (INHALATION) 4 TIMES DAILY PRN
Qty: 8 G | Refills: 3 | Status: SHIPPED | OUTPATIENT
Start: 2023-03-31

## 2023-03-31 RX ORDER — PREDNISONE 20 MG/1
20 TABLET ORAL DAILY
Qty: 5 TABLET | Refills: 0 | Status: SHIPPED | OUTPATIENT
Start: 2023-03-31

## 2023-03-31 NOTE — PROGRESS NOTES
Chief Complaint  Follow up and med refills, anxiety, asthma    You have chosen to receive care through a telehealth visit.  Do you consent to use a video/audio connection for your medical care today? Yes    Pt and physician are both in [place] at the time of the evaluation.   Subjective          Jeanine Monteiro presents to [facility] for follow up and med refills. At her last appt her buspar dose was changed from BID to TID scheduled to try to capture more of her daytime anxiety and overall she feels like right now things are going ok. She actually feels like her anxiety is doing better and was able to cut back to just BID dosing again and feels well controlled currently.   Her breathing has been acting up lately as well. She has been out of her Breo for 1-2 weeks which she thinks was the trigger, has noticed more chest tightness, difficulty breathing, wheezing. No excessive cough at this time.     Objective     Physical Exam  Vitals and nursing note reviewed.   Constitutional:       General: She is not in acute distress.     Appearance: Normal appearance.   Pulmonary:      Effort: Pulmonary effort is normal. No respiratory distress.   Neurological:      Mental Status: She is alert and oriented to person, place, and time. Mental status is at baseline.   Psychiatric:         Mood and Affect: Mood normal.         Thought Content: Thought content normal.         Judgment: Judgment normal.          Result Review : : none     Assessment and Plan      Diagnoses and all orders for this visit:    1. Moderate persistent allergic asthma  Assessment & Plan:  UNCONTROLLED  - currently flared but has been of her daily controller inhaler because she ran out and allergies have been very bad lately which are 2 triggers to explain this  - cont on controller inhaler with Breo once daily--> refills sent  - alb Rx in to have at home for prn use with onset of symptoms, for now, should use 4  x daily x 48 hours whiel restarting breo to get acute symptoms under control  - will send systemic steroid course for prn use in 48-72 hours if symptoms have persisted despite restarting ICS-LABA inh and scheduling alb as above  - cont singulair nightly for asthma and allergy trigger  - avoid asthma triggers        Orders:  -     albuterol sulfate  (90 Base) MCG/ACT inhaler; Inhale 2 puffs 4 (Four) Times a Day As Needed for Wheezing or Shortness of Air.  Dispense: 8 g; Refill: 3  -     Fluticasone Furoate-Vilanterol (Breo Ellipta) 100-25 MCG/ACT aerosol powder ; Inhale 1 puff Daily.  Dispense: 60 each; Refill: 3  -     predniSONE (DELTASONE) 20 MG tablet; Take 1 tablet by mouth Daily.  Dispense: 5 tablet; Refill: 0    2. Anxiety and depression  Assessment & Plan:  STABLE  - cont Buspar 15 mg, uses BID-TID daily  and wellbutrin 300 mg daily--> refills sent  - Reviewed potential side effects of medication including sexual performance changes, insomnia, fatigue, weight changes  - has handful of BDZ for prn use in severe situations with panic attacks, not using routinely, does not need refill at this time   - Reviewed controlled nature of substance, potential for abuse/addiction, and possible adverse effects of medication. No red flags for abuse at this time.    - Brad checked and appropriate.   -next med trials could include: topamax, latuda, rexulti,     Previous Meds Tried: sertraline (weight gain), lamictal (irritability), abilify (irritabilty), zyprexa (rash), vraylar (fatigue)    Orders:  -     buPROPion XL (Wellbutrin XL) 300 MG 24 hr tablet; Take 1 tablet by mouth Daily.  Dispense: 90 tablet; Refill: 1        Follow Up   Return in about 6 months (around 9/30/2023) for Annual physical.    Patient was given instructions and counseling regarding her condition or for health maintenance advice. Please see specific information pulled into the AVS if appropriate.     Rani Fay MD  [facility] [place] [facility]  [facility]  Phone: [phone]  Fax: [phone]

## 2023-03-31 NOTE — ASSESSMENT & PLAN NOTE
STABLE  - cont Buspar 15 mg, uses BID-TID daily  and wellbutrin 300 mg daily--> refills sent  - Reviewed potential side effects of medication including sexual performance changes, insomnia, fatigue, weight changes  - has handful of BDZ for prn use in severe situations with panic attacks, not using routinely, does not need refill at this time   - Reviewed controlled nature of substance, potential for abuse/addiction, and possible adverse effects of medication. No red flags for abuse at this time.    - Brad checked and appropriate.   -next med trials could include: topamax, latuda, rexulti,     Previous Meds Tried: sertraline (weight gain), lamictal (irritability), abilify (irritabilty), zyprexa (rash), vraylar (fatigue)

## 2023-03-31 NOTE — ASSESSMENT & PLAN NOTE
UNCONTROLLED  - currently flared but has been of her daily controller inhaler because she ran out and allergies have been very bad lately which are 2 triggers to explain this  - cont on controller inhaler with Breo once daily--> refills sent  - alb Rx in to have at home for prn use with onset of symptoms, for now, should use 4 x daily x 48 hours whiel restarting breo to get acute symptoms under control  - will send systemic steroid course for prn use in 48-72 hours if symptoms have persisted despite restarting ICS-LABA inh and scheduling alb as above  - cont singulair nightly for asthma and allergy trigger  - avoid asthma triggers

## 2023-08-30 ENCOUNTER — TELEPHONE (OUTPATIENT)
Dept: INTERNAL MEDICINE | Facility: CLINIC | Age: 33
End: 2023-08-30
Payer: COMMERCIAL

## 2023-08-30 NOTE — TELEPHONE ENCOUNTER
Caller: Jeanine Monteiro    Relationship: Self    Best call back number: [phone]     Who are you requesting to speak with (clinical staff, provider,  specific staff member): DR GODOY OR MA    What was the call regarding: PATIENT STATES THAT HER Fluticasone Furoate-Vilanterol (Breo Ellipta) 100-25 MCG/ACT aerosol powder IS NO LONGER COVERED BY HER INSURANCE, AND NEEDS TO DISCUSS POSSIBLE ALTERNATIVES. PLEASE CALL AND ADVISE

## 2023-08-31 NOTE — TELEPHONE ENCOUNTER
If Breo is not covered, she needs to call her insurance company and ask what their formulary options are for asthma that has an inhaled steroid in it, then we will know what our best option is to call in that will be covered

## 2023-11-10 ENCOUNTER — HOSPITAL ENCOUNTER (OUTPATIENT)
Facility: HOSPITAL | Age: 33
Discharge: LEFT BEFORE DISCHARGE | End: 2023-11-10
Attending: EMERGENCY MEDICINE
Payer: COMMERCIAL

## 2023-11-10 VITALS
SYSTOLIC BLOOD PRESSURE: 144 MMHG | RESPIRATION RATE: 16 BRPM | WEIGHT: 180 LBS | DIASTOLIC BLOOD PRESSURE: 86 MMHG | BODY MASS INDEX: 33.13 KG/M2 | TEMPERATURE: 98.7 F | HEART RATE: 86 BPM | OXYGEN SATURATION: 98 % | HEIGHT: 62 IN

## 2023-11-10 LAB
BILIRUB UR QL STRIP: NEGATIVE
CLARITY UR: CLEAR
COLOR UR: YELLOW
GLUCOSE UR STRIP-MCNC: NEGATIVE MG/DL
HGB UR QL STRIP.AUTO: NEGATIVE
KETONES UR QL STRIP: ABNORMAL
LEUKOCYTE ESTERASE UR QL STRIP.AUTO: NEGATIVE
NITRITE UR QL STRIP: NEGATIVE
PH UR STRIP.AUTO: 6 [PH] (ref 5–8)
PROT UR QL STRIP: NEGATIVE
SP GR UR STRIP: 1.01 (ref 1–1.03)
UROBILINOGEN UR QL STRIP: ABNORMAL

## 2023-11-10 PROCEDURE — 81003 URINALYSIS AUTO W/O SCOPE: CPT

## 2023-11-11 NOTE — ED NOTES
Pt called in triage. Pt family member notified triage staff she is taking the pt to another facility, and pt exited.

## 2023-11-17 ENCOUNTER — OFFICE VISIT (OUTPATIENT)
Dept: INTERNAL MEDICINE | Facility: CLINIC | Age: 33
End: 2023-11-17
Payer: COMMERCIAL

## 2023-11-17 VITALS
BODY MASS INDEX: 33.86 KG/M2 | SYSTOLIC BLOOD PRESSURE: 116 MMHG | HEIGHT: 62 IN | DIASTOLIC BLOOD PRESSURE: 78 MMHG | WEIGHT: 184 LBS | TEMPERATURE: 98.6 F

## 2023-11-17 DIAGNOSIS — J45.20 MILD INTERMITTENT ASTHMA WITHOUT COMPLICATION: ICD-10-CM

## 2023-11-17 DIAGNOSIS — K29.00 ACUTE GASTRITIS WITHOUT HEMORRHAGE, UNSPECIFIED GASTRITIS TYPE: Primary | ICD-10-CM

## 2023-11-17 DIAGNOSIS — F32.A ANXIETY AND DEPRESSION: ICD-10-CM

## 2023-11-17 DIAGNOSIS — J45.40 MODERATE PERSISTENT ALLERGIC ASTHMA: Chronic | ICD-10-CM

## 2023-11-17 DIAGNOSIS — F41.9 ANXIETY AND DEPRESSION: ICD-10-CM

## 2023-11-17 PROCEDURE — 99214 OFFICE O/P EST MOD 30 MIN: CPT | Performed by: INTERNAL MEDICINE

## 2023-11-17 RX ORDER — MONTELUKAST SODIUM 10 MG/1
10 TABLET ORAL NIGHTLY
Qty: 90 TABLET | Refills: 3 | Status: SHIPPED | OUTPATIENT
Start: 2023-11-17

## 2023-11-17 RX ORDER — LORAZEPAM 0.5 MG/1
0.5 TABLET ORAL 2 TIMES DAILY PRN
Qty: 60 TABLET | Refills: 0 | Status: SHIPPED | OUTPATIENT
Start: 2023-11-17

## 2023-11-17 RX ORDER — BUPROPION HYDROCHLORIDE 300 MG/1
300 TABLET ORAL DAILY
Qty: 90 TABLET | Refills: 1 | Status: SHIPPED | OUTPATIENT
Start: 2023-11-17

## 2023-11-17 RX ORDER — MOMETASONE FUROATE AND FORMOTEROL FUMARATE DIHYDRATE 100; 5 UG/1; UG/1
2 AEROSOL RESPIRATORY (INHALATION)
Qty: 13 G | Refills: 5 | Status: SHIPPED | OUTPATIENT
Start: 2023-11-17

## 2023-11-17 RX ORDER — SUCRALFATE 1 G/1
1 TABLET ORAL 4 TIMES DAILY
Qty: 56 TABLET | Refills: 0 | Status: SHIPPED | OUTPATIENT
Start: 2023-11-17 | End: 2023-12-01

## 2023-11-17 RX ORDER — PANTOPRAZOLE SODIUM 40 MG/1
40 TABLET, DELAYED RELEASE ORAL DAILY
Qty: 90 TABLET | Refills: 1 | Status: SHIPPED | OUTPATIENT
Start: 2023-11-17

## 2023-11-18 LAB — UREA BREATH TEST QL: NEGATIVE

## 2023-11-20 NOTE — PROGRESS NOTES
"Chief Complaint  Anxiety and Depression    Subjective        Jeanine Monteiro presents to [facility]  History of Present Illness  Here as transition of care from Dr. Fay  Ongoing anxiety/depression - wellbutrin has offered good relief, buspar is not helping as well, did use ativan this time last year with good response  Also with burning in epigastrium, not on PPI currently.     Objective   Vital Signs:  /78   Temp 98.6 °F (37 °C)   Ht 157.5 cm (62\")   Wt 83.5 kg (184 lb)   BMI 33.65 kg/m²   Estimated body mass index is 33.65 kg/m² as calculated from the following:    Height as of this encounter: 157.5 cm (62\").    Weight as of this encounter: 83.5 kg (184 lb).     Physical Exam  Vitals and nursing note reviewed.   Constitutional:       General: She is not in acute distress.     Appearance: Normal appearance.   HENT:      Head: Normocephalic and atraumatic.      Right Ear: External ear normal.      Left Ear: External ear normal.      Nose: Nose normal. No congestion.      Mouth/Throat:      Mouth: Mucous membranes are moist.      Pharynx: No oropharyngeal exudate or posterior oropharyngeal erythema.   Eyes:      Extraocular Movements: Extraocular movements intact.      Conjunctiva/sclera: Conjunctivae normal.      Pupils: Pupils are equal, round, and reactive to light.   Cardiovascular:      Rate and Rhythm: Normal rate and regular rhythm.      Pulses: Normal pulses.      Heart sounds: Normal heart sounds. No murmur heard.  Pulmonary:      Effort: Pulmonary effort is normal. No respiratory distress.      Breath sounds: Normal breath sounds. No wheezing or rales.   Abdominal:      General: Abdomen is flat. Bowel sounds are normal.      Palpations: Abdomen is soft.      Tenderness: There is abdominal tenderness.      Comments: Tenderness in epigastrium   Musculoskeletal:      Cervical back: Normal range of motion.   Skin:     General: Skin is warm.      " Capillary Refill: Capillary refill takes less than 2 seconds.   Neurological:      General: No focal deficit present.      Mental Status: She is alert and oriented to person, place, and time. Mental status is at baseline.      Cranial Nerves: No cranial nerve deficit.   Psychiatric:         Mood and Affect: Mood normal.         Behavior: Behavior normal.         Thought Content: Thought content normal.        Result Review :  The following data was reviewed by: Chicho De MD on 11/17/2023:  Common labs          11/10/2023    20:29   Common Labs   WBC 8.57       Hemoglobin 16.3       Hematocrit 47.9       Platelets 314          Details          This result is from an external source.             Data reviewed : prior office notes reviewed             Assessment and Plan   Diagnoses and all orders for this visit:    1. Acute gastritis without hemorrhage, unspecified gastritis type (Primary)  -     H. Pylori Breath Test - Breath, Lung; Future  -     sucralfate (Carafate) 1 g tablet; Take 1 tablet by mouth 4 (Four) Times a Day for 14 days.  Dispense: 56 tablet; Refill: 0  -     pantoprazole (Protonix) 40 MG EC tablet; Take 1 tablet by mouth Daily.  Dispense: 90 tablet; Refill: 1  -     H. Pylori Breath Test - Breath, Lung    2. Anxiety and depression  -     buPROPion XL (Wellbutrin XL) 300 MG 24 hr tablet; Take 1 tablet by mouth Daily.  Dispense: 90 tablet; Refill: 1  -     LORazepam (Ativan) 0.5 MG tablet; Take 1 tablet by mouth 2 (Two) Times a Day As Needed for Anxiety.  Dispense: 60 tablet; Refill: 0    3. Mild intermittent asthma without complication  -     mometasone-formoterol (Dulera) 100-5 MCG/ACT inhaler; Inhale 2 puffs 2 (Two) Times a Day.  Dispense: 13 g; Refill: 5    4. Moderate persistent allergic asthma  -     montelukast (SINGULAIR) 10 MG tablet; Take 1 tablet by mouth Every Night.  Dispense: 90 tablet; Refill: 3    Gastritis - will check Hpylori as she has had continued episodes intermittently responsive  to treatment  JADE/depression - depression well controlled with wellbutrin, she has more anxiety this time of year due to multiple life stressors, prn ativan to get through this time, may need to consider SSRI therapy  Change inhaler to dulera - she may need to check with passport to see what they cover        I spent 20 minutes caring for Jeanine on this date of service. This time includes time spent by me in the following activities:preparing for the visit, reviewing tests, obtaining and/or reviewing a separately obtained history, performing a medically appropriate examination and/or evaluation , counseling and educating the patient/family/caregiver, ordering medications, tests, or procedures, and documenting information in the medical record  Follow Up   Return in about 4 weeks (around 12/15/2023) for Recheck.  Patient was given instructions and counseling regarding her condition or for health maintenance advice. Please see specific information pulled into the AVS if appropriate.     Chicho De MD  [facility]  [facility] [address]  Phone: [phone]

## 2023-11-21 ENCOUNTER — TELEPHONE (OUTPATIENT)
Dept: PEDIATRICS | Facility: OTHER | Age: 33
End: 2023-11-21
Payer: COMMERCIAL

## 2023-11-21 NOTE — TELEPHONE ENCOUNTER
DELETE AFTER REVIEWING: Telephone encounter to be sent to the clinical pool     Caller: Jeanine Monteiro    Relationship: Self    Best call back number:[phone]  What medication are you requesting: ZOFRAN     What are your current symptoms: NAUSEA       Have you had these symptoms before:    [x] Yes  [] No    Have you been treated for these symptoms before:   [x] Yes  [] No    If a prescription is needed, what is your preferred pharmacy and phone number: "[id number] [address] AT [facility] & [facility] [phone] [facility] [phone] [facility]     Additional notes:

## 2023-11-22 RX ORDER — ONDANSETRON 4 MG/1
4 TABLET, ORALLY DISINTEGRATING ORAL EVERY 8 HOURS PRN
Qty: 30 TABLET | Refills: 1 | Status: SHIPPED | OUTPATIENT
Start: 2023-11-22

## 2023-12-15 ENCOUNTER — OFFICE VISIT (OUTPATIENT)
Dept: INTERNAL MEDICINE | Facility: CLINIC | Age: 33
End: 2023-12-15
Payer: COMMERCIAL

## 2023-12-15 VITALS
HEIGHT: 62 IN | TEMPERATURE: 96.7 F | HEART RATE: 89 BPM | WEIGHT: 184 LBS | OXYGEN SATURATION: 96 % | BODY MASS INDEX: 33.86 KG/M2 | RESPIRATION RATE: 15 BRPM

## 2023-12-15 DIAGNOSIS — F32.A ANXIETY AND DEPRESSION: Chronic | ICD-10-CM

## 2023-12-15 DIAGNOSIS — F41.9 ANXIETY AND DEPRESSION: Chronic | ICD-10-CM

## 2023-12-15 DIAGNOSIS — J45.40 MODERATE PERSISTENT REACTIVE AIRWAY DISEASE WITHOUT COMPLICATION: Primary | ICD-10-CM

## 2023-12-15 PROCEDURE — 99214 OFFICE O/P EST MOD 30 MIN: CPT | Performed by: INTERNAL MEDICINE

## 2023-12-15 RX ORDER — AZITHROMYCIN 250 MG/1
TABLET, FILM COATED ORAL
Qty: 6 TABLET | Refills: 0 | Status: SHIPPED | OUTPATIENT
Start: 2023-12-15

## 2023-12-15 RX ORDER — METHYLPREDNISOLONE 4 MG/1
TABLET ORAL
Qty: 21 TABLET | Refills: 0 | Status: SHIPPED | OUTPATIENT
Start: 2023-12-15

## 2023-12-15 RX ORDER — LORAZEPAM 1 MG/1
1 TABLET ORAL EVERY 8 HOURS PRN
Qty: 60 TABLET | Refills: 0 | Status: SHIPPED | OUTPATIENT
Start: 2023-12-15

## 2023-12-15 RX ORDER — DEXTROMETHORPHAN HYDROBROMIDE AND PROMETHAZINE HYDROCHLORIDE 15; 6.25 MG/5ML; MG/5ML
2.5 SYRUP ORAL 4 TIMES DAILY PRN
Qty: 200 ML | Refills: 0 | Status: SHIPPED | OUTPATIENT
Start: 2023-12-15

## 2023-12-15 NOTE — PROGRESS NOTES
"Chief Complaint  Follow-up, Anxiety, Depression, and Asthma    Subjective        Jeanine Monteiro presents to [facility]  History of Present Illness  Here for follow up  Stomach issues have nearly resolved with PPI and carafate therapy  Now with viral URI with some shortness of breath and underlying asthma  She also reports worsening anxiety which happens around this time of year  She did have prior genesight testing and will get me those results.     Objective   Vital Signs:  Pulse 89   Temp 96.7 °F (35.9 °C)   Resp 15   Ht 157.5 cm (62\")   Wt 83.5 kg (184 lb)   SpO2 96%   BMI 33.65 kg/m²   Estimated body mass index is 33.65 kg/m² as calculated from the following:    Height as of this encounter: 157.5 cm (62\").    Weight as of this encounter: 83.5 kg (184 lb).       Physical Exam  Vitals and nursing note reviewed.   Constitutional:       General: She is not in acute distress.     Appearance: Normal appearance.   HENT:      Head: Normocephalic and atraumatic.      Right Ear: External ear normal.      Left Ear: External ear normal.      Nose: Nose normal. No congestion.      Mouth/Throat:      Mouth: Mucous membranes are moist.      Pharynx: No oropharyngeal exudate or posterior oropharyngeal erythema.   Eyes:      Extraocular Movements: Extraocular movements intact.      Conjunctiva/sclera: Conjunctivae normal.      Pupils: Pupils are equal, round, and reactive to light.   Cardiovascular:      Rate and Rhythm: Normal rate and regular rhythm.      Pulses: Normal pulses.      Heart sounds: Normal heart sounds. No murmur heard.  Pulmonary:      Effort: Pulmonary effort is normal. No respiratory distress.      Breath sounds: Wheezing present. No rales.   Musculoskeletal:      Cervical back: Normal range of motion.   Skin:     General: Skin is warm.      Capillary Refill: Capillary refill takes less than 2 seconds.   Neurological:      General: No focal deficit " present.      Mental Status: She is alert and oriented to person, place, and time. Mental status is at baseline.      Cranial Nerves: No cranial nerve deficit.   Psychiatric:         Mood and Affect: Mood normal.         Behavior: Behavior normal.         Thought Content: Thought content normal.        Result Review :  The following data was reviewed by: Chicho De MD on 12/15/2023:  Common labs          11/10/2023    20:29   Common Labs   WBC 8.57       Hemoglobin 16.3       Hematocrit 47.9       Platelets 314          Details          This result is from an external source.             Data reviewed : prior office notes reviewed             Assessment and Plan   Diagnoses and all orders for this visit:    1. Moderate persistent reactive airway disease without complication (Primary)  -     methylPREDNISolone (MEDROL) 4 MG dose pack; Take as directed on package instructions.  Dispense: 21 tablet; Refill: 0  -     azithromycin (Zithromax Z-Maximiliano) 250 MG tablet; Take 2 tablets by mouth on day 1, then 1 tablet daily on days 2-5  Dispense: 6 tablet; Refill: 0  -     promethazine-dextromethorphan (PROMETHAZINE-DM) 6.25-15 MG/5ML syrup; Take 2.5 mL by mouth 4 (Four) Times a Day As Needed for Cough.  Dispense: 200 mL; Refill: 0    2. Anxiety and depression  -     LORazepam (Ativan) 1 MG tablet; Take 1 tablet by mouth Every 8 (Eight) Hours As Needed for Anxiety.  Dispense: 60 tablet; Refill: 0    Asthma exacerbation 2/2 viral URI - start medrol, zpak for anti-inflammatory effects  Inc ativan dose for time being for her situational anxiety, plan to review genesight testing next visit        I spent 20 minutes caring for Jeanine on this date of service. This time includes time spent by me in the following activities:preparing for the visit, reviewing tests, obtaining and/or reviewing a separately obtained history, performing a medically appropriate examination and/or evaluation , counseling and educating the  patient/family/caregiver, ordering medications, tests, or procedures, and documenting information in the medical record  Follow Up   Return for Next scheduled follow up.  Patient was given instructions and counseling regarding her condition or for health maintenance advice. Please see specific information pulled into the AVS if appropriate.     Chicho De MD  [facility] Primary Care  [facility]  Phone: [phone]

## 2024-01-15 ENCOUNTER — TELEMEDICINE (OUTPATIENT)
Dept: INTERNAL MEDICINE | Facility: CLINIC | Age: 34
End: 2024-01-15
Payer: COMMERCIAL

## 2024-01-15 ENCOUNTER — TELEPHONE (OUTPATIENT)
Dept: INTERNAL MEDICINE | Facility: CLINIC | Age: 34
End: 2024-01-15
Payer: COMMERCIAL

## 2024-01-15 DIAGNOSIS — J45.40 MODERATE PERSISTENT REACTIVE AIRWAY DISEASE WITHOUT COMPLICATION: ICD-10-CM

## 2024-01-15 DIAGNOSIS — F32.A ANXIETY AND DEPRESSION: Chronic | ICD-10-CM

## 2024-01-15 DIAGNOSIS — F41.9 ANXIETY AND DEPRESSION: Chronic | ICD-10-CM

## 2024-01-15 DIAGNOSIS — U07.1 COVID-19 VIRUS DETECTED: Primary | ICD-10-CM

## 2024-01-15 DIAGNOSIS — J45.40 MODERATE PERSISTENT ALLERGIC ASTHMA: Chronic | ICD-10-CM

## 2024-01-15 PROCEDURE — 99214 OFFICE O/P EST MOD 30 MIN: CPT | Performed by: INTERNAL MEDICINE

## 2024-01-15 RX ORDER — ALBUTEROL SULFATE 90 UG/1
2 AEROSOL, METERED RESPIRATORY (INHALATION) 4 TIMES DAILY PRN
Qty: 8 G | Refills: 3 | Status: SHIPPED | OUTPATIENT
Start: 2024-01-15

## 2024-01-15 RX ORDER — ALBUTEROL SULFATE 2.5 MG/3ML
2.5 SOLUTION RESPIRATORY (INHALATION) EVERY 4 HOURS PRN
Qty: 45 ML | Refills: 3 | Status: SHIPPED | OUTPATIENT
Start: 2024-01-15

## 2024-01-15 RX ORDER — LORAZEPAM 1 MG/1
1 TABLET ORAL EVERY 8 HOURS PRN
Qty: 60 TABLET | Refills: 0 | Status: SHIPPED | OUTPATIENT
Start: 2024-01-15

## 2024-01-15 RX ORDER — DEXTROMETHORPHAN HYDROBROMIDE AND PROMETHAZINE HYDROCHLORIDE 15; 6.25 MG/5ML; MG/5ML
2.5 SYRUP ORAL 4 TIMES DAILY PRN
Qty: 200 ML | Refills: 0 | Status: SHIPPED | OUTPATIENT
Start: 2024-01-15

## 2024-01-15 RX ORDER — METHYLPREDNISOLONE 4 MG/1
TABLET ORAL
Qty: 21 TABLET | Refills: 0 | Status: SHIPPED | OUTPATIENT
Start: 2024-01-15

## 2024-01-15 NOTE — PROGRESS NOTES
Chief Complaint  URI (Covid+)    Subjective        Jeanine Monteiro presents to [facility]  History of Present Illness  Tested positive on Saturday 1/13/24  Symptoms started headache Monday, symptoms stable but then Friday night sore throat, congestion on Saturday, ear pain and pressure, cough, some shortness of breath with exertion.       Current Outpatient Medications:     albuterol (PROVENTIL) (2.5 MG/3ML) 0.083% nebulizer solution, Take 2.5 mg by nebulization Every 4 (Four) Hours As Needed for Wheezing or Shortness of Air., Disp: 45 mL, Rfl: 3    albuterol sulfate  (90 Base) MCG/ACT inhaler, Inhale 2 puffs 4 (Four) Times a Day As Needed for Wheezing or Shortness of Air., Disp: 8 g, Rfl: 3    promethazine-dextromethorphan (PROMETHAZINE-DM) 6.25-15 MG/5ML syrup, Take 2.5 mL by mouth 4 (Four) Times a Day As Needed for Cough., Disp: 200 mL, Rfl: 0    azithromycin (Zithromax Z-Maximiliano) 250 MG tablet, Take 2 tablets by mouth on day 1, then 1 tablet daily on days 2-5, Disp: 6 tablet, Rfl: 0    buPROPion XL (Wellbutrin XL) 300 MG 24 hr tablet, Take 1 tablet by mouth Daily., Disp: 90 tablet, Rfl: 1    busPIRone (BUSPAR) 15 MG tablet, Take 1 tablet by mouth 3 (Three) Times a Day., Disp: 270 tablet, Rfl: 1    cetirizine (zyrTEC) 5 MG tablet, Take 1 tablet by mouth Daily., Disp: , Rfl:     diclofenac (VOLTAREN) 75 MG EC tablet, Take 1 tablet by mouth 2 (Two) Times a Day., Disp: , Rfl:     Fluticasone Furoate-Vilanterol (Breo Ellipta) 100-25 MCG/ACT aerosol powder , Inhale 1 puff Daily., Disp: 60 each, Rfl: 3    LORazepam (Ativan) 1 MG tablet, Take 1 tablet by mouth Every 8 (Eight) Hours As Needed for Anxiety., Disp: 60 tablet, Rfl: 0    meloxicam (MOBIC) 15 MG tablet, TAKE 1 TABLET BY MOUTH DAILY, Disp: 30 tablet, Rfl: 2    methylPREDNISolone (MEDROL) 4 MG dose pack, Take as directed on package instructions., Disp: 21 tablet, Rfl: 0    mometasone-formoterol (Dulera) 100-5  MCG/ACT inhaler, Inhale 2 puffs 2 (Two) Times a Day., Disp: 13 g, Rfl: 5    montelukast (SINGULAIR) 10 MG tablet, Take 1 tablet by mouth Every Night., Disp: 90 tablet, Rfl: 3    Nirmatrelvir & Ritonavir, 300mg/100mg, (PAXLOVID) 20 x 150 MG & 10 x 100MG tablet therapy pack tablet, Take 3 tablets by mouth 2 (Two) Times a Day for 5 days., Disp: 30 tablet, Rfl: 0    ondansetron ODT (ZOFRAN-ODT) 4 MG disintegrating tablet, Place 1 tablet on the tongue Every 8 (Eight) Hours As Needed for Nausea or Vomiting., Disp: 30 tablet, Rfl: 1    pantoprazole (Protonix) 40 MG EC tablet, Take 1 tablet by mouth Daily., Disp: 90 tablet, Rfl: 1  Past Medical History:   Diagnosis Date    Anxiety and depression 2022    Asthma     Diverticulitis     Fibrocystic breast changes, bilateral 2022    Moderate persistent allergic asthma 2022     Past Surgical History:   Procedure Laterality Date     SECTION      COLONOSCOPY N/A 10/11/2022    Procedure: COLONOSCOPY;  Surgeon: Marty Eugene MD;  [place] [facility] [place]  Service: Gastroenterology;  Laterality: N/A;  Diverticulosis    ENDOSCOPY N/A 10/11/2022    Procedure: ESOPHAGOGASTRODUODENOSCOPY;  Surgeon: Marty Eugene MD;  [place] [facility] [place];  Service: Gastroenterology;  Laterality: N/A;  Esophagitis    TONSILLECTOMY       Family History   Problem Relation Age of Onset    Asthma Mother     Diverticulitis Mother     Breast cancer Paternal Aunt 60        great paternal aunt    Lung cancer Paternal Aunt     Leukemia Maternal Grandfather     Irritable bowel syndrome Paternal Grandmother     Hypertension Paternal Grandmother     Colon cancer Neg Hx     Colon polyps Neg Hx     Crohn's disease Neg Hx     Ulcerative colitis Neg Hx      Social History     Socioeconomic History    Marital status: Single   Tobacco Use    Smoking status: Former     Types: Cigarettes     Quit date: 2018     Years since quittin.7    Smokeless tobacco: Never   Vaping Use     "Vaping Use: Every day    Substances: Nicotine   Substance and Sexual Activity    Alcohol use: Yes     Alcohol/week: 6.0 standard drinks of alcohol     Types: 6 Glasses of wine per week    Drug use: Never    Sexual activity: Yes     Partners: Female     Birth control/protection: None     =  HM reviewed and updated    Objective   Vital Signs:  There were no vitals taken for this visit.  Estimated body mass index is 33.65 kg/m² as calculated from the following:    Height as of 12/15/23: 157.5 cm (62\").    Weight as of 12/15/23: 83.5 kg (184 lb).  Physical Exam  Vitals and nursing note reviewed.   Constitutional:       General: She is not in acute distress.     Appearance: Normal appearance. She is not ill-appearing.   HENT:      Head: Normocephalic and atraumatic.      Right Ear: External ear normal.      Left Ear: External ear normal.   Eyes:      General:         Right eye: No discharge.         Left eye: No discharge.      Extraocular Movements: Extraocular movements intact.      Conjunctiva/sclera: Conjunctivae normal.      Pupils: Pupils are equal, round, and reactive to light.   Pulmonary:      Effort: Pulmonary effort is normal.   Neurological:      General: No focal deficit present.      Mental Status: She is alert. Mental status is at baseline.      Cranial Nerves: No cranial nerve deficit.   Psychiatric:         Mood and Affect: Mood normal.         Behavior: Behavior normal.         Thought Content: Thought content normal.        Result Review :  The following data was reviewed by: Chicho De MD on 01/15/2024:  Common labs          11/10/2023    20:29   Common Labs   WBC 8.57       Hemoglobin 16.3       Hematocrit 47.9       Platelets 314          Details          This result is from an external source.             Data reviewed : prior office notes reviewed           Assessment and Plan   Diagnoses and all orders for this visit:    1. COVID-19 virus detected (Primary)  -     Nirmatrelvir & Ritonavir, " 300mg/100mg, (PAXLOVID) 20 x 150 MG & 10 x 100MG tablet therapy pack tablet; Take 3 tablets by mouth 2 (Two) Times a Day for 5 days.  Dispense: 30 tablet; Refill: 0    2. Moderate persistent allergic asthma  -     albuterol (PROVENTIL) (2.5 MG/3ML) 0.083% nebulizer solution; Take 2.5 mg by nebulization Every 4 (Four) Hours As Needed for Wheezing or Shortness of Air.  Dispense: 45 mL; Refill: 3  -     albuterol sulfate  (90 Base) MCG/ACT inhaler; Inhale 2 puffs 4 (Four) Times a Day As Needed for Wheezing or Shortness of Air.  Dispense: 8 g; Refill: 3    3. Moderate persistent reactive airway disease without complication  -     promethazine-dextromethorphan (PROMETHAZINE-DM) 6.25-15 MG/5ML syrup; Take 2.5 mL by mouth 4 (Four) Times a Day As Needed for Cough.  Dispense: 200 mL; Refill: 0    Covid 19 infection - risk for disease progression given underlying asthma, recommend to start paxlovid, continue albuterol q4h, start medrol, return precautions discussed.       I spent 20 minutes caring for Jeanine on this date of service. This time includes time spent by me in the following activities:preparing for the visit, reviewing tests, obtaining and/or reviewing a separately obtained history, performing a medically appropriate examination and/or evaluation , counseling and educating the patient/family/caregiver, ordering medications, tests, or procedures, and documenting information in the medical record  Follow Up   No follow-ups on file.  Patient was given instructions and counseling regarding her condition or for health maintenance advice. Please see specific information pulled into the AVS if appropriate.     Chicho De MD  [facility]

## 2024-01-15 NOTE — TELEPHONE ENCOUNTER
Rx Refill Note  Requested Prescriptions     Pending Prescriptions Disp Refills    methylPREDNISolone (MEDROL) 4 MG dose pack 21 tablet 0     Sig: Take as directed on package instructions.    LORazepam (Ativan) 1 MG tablet 60 tablet 0     Sig: Take 1 tablet by mouth Every 8 (Eight) Hours As Needed for Anxiety.      Last office visit with prescribing clinician: 12/15/2023   Last telemedicine visit with prescribing clinician: Visit date not found   Next office visit with prescribing clinician: 1/15/2024                         Would you like a call back once the refill request has been completed: [] Yes [] No    If the office needs to give you a call back, can they leave a voicemail: [] Yes [] No    Mariaa Bermeo MA  01/15/24, 10:27 EST

## 2024-01-15 NOTE — TELEPHONE ENCOUNTER
Caller: Jeanine Monteiro    Relationship: Self    Best call back number: [phone]    What was the call regarding: PATIENT STATED THAT THEY HAD TESTED POSITIVE FOR COVID AND THAT IT IS TRIGGERING THEIR ASTHMA. STATED THAT THEY WOULD LIKE FOR THE MEDICATIONS THAT THEY REQUESTED THROUGH THEIR [facility] MESSAGES BE CALLED IN AS SOON AS SOMEONE IS AVAILABLE FOR THAT. STATED THAT THEY ARE HAVING TROUBLE BREATHING AND ARE TRYING TO AVOID THE HOSPITAL. PLEASE CALL AND ADVISE

## 2024-06-06 ENCOUNTER — OFFICE VISIT (OUTPATIENT)
Dept: INTERNAL MEDICINE | Facility: CLINIC | Age: 34
End: 2024-06-06
Payer: COMMERCIAL

## 2024-06-06 VITALS
OXYGEN SATURATION: 96 % | RESPIRATION RATE: 16 BRPM | DIASTOLIC BLOOD PRESSURE: 74 MMHG | BODY MASS INDEX: 32.61 KG/M2 | SYSTOLIC BLOOD PRESSURE: 118 MMHG | WEIGHT: 177.2 LBS | HEART RATE: 76 BPM | HEIGHT: 62 IN

## 2024-06-06 DIAGNOSIS — J45.40 MODERATE PERSISTENT ALLERGIC ASTHMA: Chronic | ICD-10-CM

## 2024-06-06 PROCEDURE — 99214 OFFICE O/P EST MOD 30 MIN: CPT | Performed by: INTERNAL MEDICINE

## 2024-06-06 PROCEDURE — 1160F RVW MEDS BY RX/DR IN RCRD: CPT | Performed by: INTERNAL MEDICINE

## 2024-06-06 PROCEDURE — 1159F MED LIST DOCD IN RCRD: CPT | Performed by: INTERNAL MEDICINE

## 2024-06-06 RX ORDER — FLUTICASONE PROPIONATE AND SALMETEROL 250; 50 UG/1; UG/1
1 POWDER RESPIRATORY (INHALATION)
Qty: 60 EACH | Refills: 2 | Status: SHIPPED | OUTPATIENT
Start: 2024-06-06

## 2024-06-06 RX ORDER — ALBUTEROL SULFATE 2.5 MG/3ML
2.5 SOLUTION RESPIRATORY (INHALATION) EVERY 4 HOURS PRN
Qty: 45 ML | Refills: 3 | Status: SHIPPED | OUTPATIENT
Start: 2024-06-06

## 2024-06-06 RX ORDER — ALBUTEROL SULFATE 90 UG/1
2 AEROSOL, METERED RESPIRATORY (INHALATION) 4 TIMES DAILY PRN
Qty: 8 G | Refills: 3 | Status: SHIPPED | OUTPATIENT
Start: 2024-06-06

## 2024-06-06 NOTE — PROGRESS NOTES
"Chief Complaint  Asthma (Needs medication )    Subjective        Jeanine Monteiro presents to [facility]  Asthma  Her past medical history is significant for asthma.     Presents today for asthma.   She stopped taking Singulair in February due to worsening anxiety and depression. She has tried to manage asthma with albuterol alone but has felt like her asthma has not been well controlled since she stopped taking Sinulair.   She states that she will often wake up and have to immediately use her albuterol. She has been using her albuterol inhaler 3x a day for the past 2-3 weeks. She has not been on controller inhaler for several months now due to insurance coverage issues.   Allergies do seem to be a trigger. Is taking Zyrtec but does not currently use Flonase.       Objective   Vital Signs:  /74   Pulse 76   Resp 16   Ht 157.5 cm (62\")   Wt 80.4 kg (177 lb 3.2 oz)   SpO2 96%   BMI 32.41 kg/m²   Estimated body mass index is 32.41 kg/m² as calculated from the following:    Height as of this encounter: 157.5 cm (62\").    Weight as of this encounter: 80.4 kg (177 lb 3.2 oz).             Physical Exam  Constitutional:       Appearance: Normal appearance.   HENT:      Right Ear: External ear normal.      Left Ear: External ear normal.      Nose: Nose normal.      Mouth/Throat:      Mouth: Mucous membranes are moist.   Eyes:      Extraocular Movements: Extraocular movements intact.      Conjunctiva/sclera: Conjunctivae normal.   Cardiovascular:      Rate and Rhythm: Normal rate and regular rhythm.      Heart sounds: Normal heart sounds.   Pulmonary:      Effort: Pulmonary effort is normal.      Breath sounds: Normal breath sounds. No wheezing.   Musculoskeletal:         General: Normal range of motion.      Cervical back: Normal range of motion.   Skin:     General: Skin is warm.      Findings: No rash.   Neurological:      General: No focal deficit present.      " Mental Status: She is alert and oriented to person, place, and time. Mental status is at baseline.   Psychiatric:         Mood and Affect: Mood normal.         Behavior: Behavior normal.        Result Review :    The following data was reviewed by: Chicho De MD on 06/06/2024:  Common labs          11/10/2023    20:29   Common Labs   WBC 8.57       Hemoglobin 16.3       Hematocrit 47.9       Platelets 314          Details          This result is from an external source.             Data reviewed : prior office notes reviewed             Assessment and Plan     Jeanine Monteiro is a 33 y.o. female with moderate persistent asthma who presents today with 3 weeks of worsening chest tightness and shortness of breath requiring more frequent albuterol use. She recently stopped taking singular due to side effects and has not been on controller inhaler for quite some time now. Do think that a ICS/LABA controller would be beneficial for her. Will refer to Allergy as there seems to be an allergic component contributing to her asthma.       Diagnoses and all orders for this visit:    1. Moderate persistent allergic asthma  -     Fluticasone-Salmeterol (ADVAIR/WIXELA) 250-50 MCG/ACT DISKUS; Inhale 1 puff 2 (Two) Times a Day.  Dispense: 60 each; Refill: 2  -     albuterol sulfate  (90 Base) MCG/ACT inhaler; Inhale 2 puffs 4 (Four) Times a Day As Needed for Wheezing or Shortness of Air.  Dispense: 8 g; Refill: 3  -     albuterol (PROVENTIL) (2.5 MG/3ML) 0.083% nebulizer solution; Take 2.5 mg by nebulization Every 4 (Four) Hours As Needed for Wheezing or Shortness of Air.  Dispense: 45 mL; Refill: 3  -     Ambulatory Referral to Allergy           I spent 20 minutes caring for Jeanine on this date of service. This time includes time spent by me in the following activities:preparing for the visit, reviewing tests, obtaining and/or reviewing a separately obtained history, performing a medically appropriate examination and/or  evaluation , counseling and educating the patient/family/caregiver, ordering medications, tests, or procedures, and documenting information in the medical record  Follow Up     No follow-ups on file.  Patient was given instructions and counseling regarding her condition or for health maintenance advice. Please see specific information pulled into the AVS if appropriate.       Patient seen and examined personally by me following resident evaluation. Agree with assessment and plan as above unless documented below.    Chicho De MD   [place] [facility]

## 2025-03-10 ENCOUNTER — APPOINTMENT (OUTPATIENT)
Dept: GENERAL RADIOLOGY | Facility: HOSPITAL | Age: 35
End: 2025-03-10
Payer: COMMERCIAL

## 2025-03-10 ENCOUNTER — HOSPITAL ENCOUNTER (EMERGENCY)
Facility: HOSPITAL | Age: 35
Discharge: HOME | End: 2025-03-10
Attending: EMERGENCY MEDICINE | Admitting: EMERGENCY MEDICINE
Payer: COMMERCIAL

## 2025-03-10 VITALS
BODY MASS INDEX: 31.28 KG/M2 | HEIGHT: 62 IN | DIASTOLIC BLOOD PRESSURE: 90 MMHG | RESPIRATION RATE: 20 BRPM | HEART RATE: 98 BPM | WEIGHT: 170 LBS | SYSTOLIC BLOOD PRESSURE: 104 MMHG | OXYGEN SATURATION: 93 % | TEMPERATURE: 97.2 F

## 2025-03-10 DIAGNOSIS — S42.292A CLOSED HILL-SACHS FRACTURE OF LEFT HUMERUS, INITIAL ENCOUNTER: ICD-10-CM

## 2025-03-10 DIAGNOSIS — S43.015A ANTERIOR DISLOCATION OF LEFT SHOULDER, INITIAL ENCOUNTER: Primary | ICD-10-CM

## 2025-03-10 PROCEDURE — 25010000002 PROPOFOL 500 MG/50ML EMULSION: Performed by: EMERGENCY MEDICINE

## 2025-03-10 PROCEDURE — 96375 TX/PRO/DX INJ NEW DRUG ADDON: CPT

## 2025-03-10 PROCEDURE — 25010000002 MORPHINE PER 10 MG: Performed by: EMERGENCY MEDICINE

## 2025-03-10 PROCEDURE — 73030 X-RAY EXAM OF SHOULDER: CPT

## 2025-03-10 PROCEDURE — 96374 THER/PROPH/DIAG INJ IV PUSH: CPT

## 2025-03-10 PROCEDURE — 99285 EMERGENCY DEPT VISIT HI MDM: CPT

## 2025-03-10 PROCEDURE — 25010000002 ONDANSETRON PER 1 MG: Performed by: PHYSICIAN ASSISTANT

## 2025-03-10 RX ORDER — MORPHINE SULFATE 2 MG/ML
4 INJECTION, SOLUTION INTRAMUSCULAR; INTRAVENOUS ONCE
Status: COMPLETED | OUTPATIENT
Start: 2025-03-10 | End: 2025-03-10

## 2025-03-10 RX ORDER — ONDANSETRON 2 MG/ML
4 INJECTION INTRAMUSCULAR; INTRAVENOUS ONCE
Status: COMPLETED | OUTPATIENT
Start: 2025-03-10 | End: 2025-03-10

## 2025-03-10 RX ORDER — SODIUM CHLORIDE 0.9 % (FLUSH) 0.9 %
10 SYRINGE (ML) INJECTION AS NEEDED
Status: DISCONTINUED | OUTPATIENT
Start: 2025-03-10 | End: 2025-03-10 | Stop reason: HOSPADM

## 2025-03-10 RX ORDER — PROPOFOL 10 MG/ML
200 INJECTION, EMULSION INTRAVENOUS ONCE AS NEEDED
Status: DISCONTINUED | OUTPATIENT
Start: 2025-03-10 | End: 2025-03-10 | Stop reason: HOSPADM

## 2025-03-10 RX ORDER — HYDROCODONE BITARTRATE AND ACETAMINOPHEN 5; 325 MG/1; MG/1
1 TABLET ORAL 4 TIMES DAILY PRN
Qty: 10 TABLET | Refills: 0 | Status: SHIPPED | OUTPATIENT
Start: 2025-03-10 | End: 2025-03-13

## 2025-03-10 RX ADMIN — ONDANSETRON 4 MG: 2 INJECTION, SOLUTION INTRAMUSCULAR; INTRAVENOUS at 02:29

## 2025-03-10 RX ADMIN — MORPHINE SULFATE 4 MG: 2 INJECTION, SOLUTION INTRAMUSCULAR; INTRAVENOUS at 02:29

## 2025-03-10 NOTE — Clinical Note
[facility] EMERGENCY DEPARTMENT  [address] [facility] [address]  Phone: [phone]    Jeanine Monteiro was seen and treated in our emergency department on 3/10/2025.  She may return to work on 03/14/2025.         Thank you for choosing [facility].    Saskia Caballero MD

## 2025-03-10 NOTE — Clinical Note
[facility] EMERGENCY DEPARTMENT  [address] [facility] [address]  Phone: [phone]    Jeanine Monteiro was seen and treated in our emergency department on 3/10/2025.  She may return to work on 03/14/2025.         Thank you for choosing [facility].    Saskia Caballero MD

## 2025-03-10 NOTE — DISCHARGE INSTRUCTIONS
Call make follow-up appointment with Dr. Restrepo.  Follow-up with a primary care provider.  Use sling for the next 2 to 4 weeks to provide support for your shoulder.  Apply ice to the shoulder to help with pain and swelling.  Use Tylenol or ibuprofen to help with pain.  You may use the hydrocodone if you have severe pain.  This medication has potential for addiction.  This may cause drowsiness, constipation, nausea.  Return to emergency department for any worsening symptoms.
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
## 2025-03-19 DIAGNOSIS — J45.41 MODERATE PERSISTENT ASTHMA WITH ACUTE EXACERBATION: ICD-10-CM

## 2025-03-19 RX ORDER — MONTELUKAST SODIUM 10 MG/1
10 TABLET ORAL NIGHTLY
Qty: 30 TABLET | Refills: 0 | Status: SHIPPED | OUTPATIENT
Start: 2025-03-19 | End: 2025-04-18

## 2025-03-19 RX ORDER — FLUTICASONE PROPIONATE AND SALMETEROL 250; 50 UG/1; UG/1
1 POWDER RESPIRATORY (INHALATION)
Qty: 60 EACH | Refills: 0 | Status: SHIPPED | OUTPATIENT
Start: 2025-03-19 | End: 2025-04-18

## 2025-03-19 NOTE — TELEPHONE ENCOUNTER
Caller: Jeanine Monteiro    Relationship: Self    Best call back number: [phone]    Requested Prescriptions:   Requested Prescriptions     Pending Prescriptions Disp Refills    montelukast (SINGULAIR) 10 MG tablet 30 tablet 0     Sig: Take 1 tablet by mouth Every Night for 30 days.    Fluticasone-Salmeterol (ADVAIR/WIXELA) 250-50 MCG/ACT DISKUS 60 each 0     Sig: Inhale 1 puff 2 (Two) Times a Day for 30 days.        Pharmacy where request should be sent: [id number] [address] [facility] [address] AT [facility] & [facility] - [phone] [facility] [phone]      Last office visit with prescribing clinician: 6/6/2024   Last telemedicine visit with prescribing clinician: Visit date not found   Next office visit with prescribing clinician: 5/9/2025     Additional details provided by patient: PATIENT HAS A 9-10 DAY SUPPLY LEFT OF THESE MEDICATIONS.     Does the patient have less than a 3 day supply:  [] Yes  [x] No    Would you like a call back once the refill request has been completed: [] Yes [x] No    If the office needs to give you a call back, can they leave a voicemail: [] Yes [x] No    James Waller Rep   03/19/25 14:13 EDT

## 2025-03-28 ENCOUNTER — OFFICE VISIT (OUTPATIENT)
Dept: INTERNAL MEDICINE | Facility: CLINIC | Age: 35
End: 2025-03-28
Payer: COMMERCIAL

## 2025-03-28 VITALS
DIASTOLIC BLOOD PRESSURE: 68 MMHG | WEIGHT: 166.4 LBS | OXYGEN SATURATION: 97 % | RESPIRATION RATE: 16 BRPM | HEIGHT: 62 IN | BODY MASS INDEX: 30.62 KG/M2 | SYSTOLIC BLOOD PRESSURE: 126 MMHG | HEART RATE: 97 BPM

## 2025-03-28 DIAGNOSIS — R41.840 ATTENTION DEFICIT: Primary | ICD-10-CM

## 2025-03-28 DIAGNOSIS — J45.41 MODERATE PERSISTENT ASTHMA WITH ACUTE EXACERBATION: ICD-10-CM

## 2025-03-28 DIAGNOSIS — J45.40 MODERATE PERSISTENT ALLERGIC ASTHMA: Chronic | ICD-10-CM

## 2025-03-28 PROCEDURE — 1159F MED LIST DOCD IN RCRD: CPT | Performed by: INTERNAL MEDICINE

## 2025-03-28 PROCEDURE — 1160F RVW MEDS BY RX/DR IN RCRD: CPT | Performed by: INTERNAL MEDICINE

## 2025-03-28 PROCEDURE — 99214 OFFICE O/P EST MOD 30 MIN: CPT | Performed by: INTERNAL MEDICINE

## 2025-03-28 RX ORDER — FLUTICASONE PROPIONATE AND SALMETEROL 250; 50 UG/1; UG/1
1 POWDER RESPIRATORY (INHALATION)
Qty: 60 EACH | Refills: 11 | Status: SHIPPED | OUTPATIENT
Start: 2025-03-28 | End: 2025-04-27

## 2025-03-28 RX ORDER — ALBUTEROL SULFATE 0.83 MG/ML
2.5 SOLUTION RESPIRATORY (INHALATION) EVERY 4 HOURS PRN
Qty: 45 ML | Refills: 3 | Status: SHIPPED | OUTPATIENT
Start: 2025-03-28

## 2025-03-28 RX ORDER — HYDROCODONE BITARTRATE AND ACETAMINOPHEN 10; 325 MG/1; MG/1
TABLET ORAL
COMMUNITY
Start: 2025-03-14

## 2025-03-28 RX ORDER — ALBUTEROL SULFATE 90 UG/1
2 INHALANT RESPIRATORY (INHALATION) 4 TIMES DAILY PRN
Qty: 8 G | Refills: 3 | Status: SHIPPED | OUTPATIENT
Start: 2025-03-28

## 2025-03-28 RX ORDER — MONTELUKAST SODIUM 10 MG/1
10 TABLET ORAL NIGHTLY
Qty: 30 TABLET | Refills: 11 | Status: SHIPPED | OUTPATIENT
Start: 2025-03-28 | End: 2025-04-27

## 2025-03-28 RX ORDER — CYCLOBENZAPRINE HCL 10 MG
1 TABLET ORAL 3 TIMES DAILY
COMMUNITY
Start: 2025-03-14

## 2025-03-28 NOTE — PROGRESS NOTES
"Chief Complaint  Follow-up (Med refill f/u)    Subjective        Jeanine Monteiro presents to [facility]  History of Present Illness  Here for follow up   Doing okay overall but some worsening mental health  Has some anxiety but also wondering if some underlying ADHD and curious about options/testing, etc.   Asthma controlled with current regimen, needs refills     Objective   Vital Signs:  /68   Pulse 97   Resp 16   Ht 157.5 cm (62\")   Wt 75.5 kg (166 lb 6.4 oz)   SpO2 97%   BMI 30.43 kg/m²   Estimated body mass index is 30.43 kg/m² as calculated from the following:    Height as of this encounter: 157.5 cm (62\").    Weight as of this encounter: 75.5 kg (166 lb 6.4 oz).    Physical Exam  Vitals and nursing note reviewed.   Constitutional:       General: She is not in acute distress.     Appearance: Normal appearance.   HENT:      Head: Normocephalic and atraumatic.      Right Ear: External ear normal.      Left Ear: External ear normal.      Nose: Nose normal. No congestion.      Mouth/Throat:      Mouth: Mucous membranes are moist.      Pharynx: No oropharyngeal exudate or posterior oropharyngeal erythema.   Eyes:      Extraocular Movements: Extraocular movements intact.      Conjunctiva/sclera: Conjunctivae normal.      Pupils: Pupils are equal, round, and reactive to light.   Cardiovascular:      Rate and Rhythm: Normal rate and regular rhythm.      Pulses: Normal pulses.      Heart sounds: Normal heart sounds. No murmur heard.  Pulmonary:      Effort: Pulmonary effort is normal. No respiratory distress.      Breath sounds: Normal breath sounds. No wheezing or rales.   Musculoskeletal:      Cervical back: Normal range of motion.   Skin:     General: Skin is warm.      Capillary Refill: Capillary refill takes less than 2 seconds.   Neurological:      General: No focal deficit present.      Mental Status: She is alert and oriented to person, place, and " time. Mental status is at baseline.      Cranial Nerves: No cranial nerve deficit.   Psychiatric:         Mood and Affect: Mood normal.         Behavior: Behavior normal.         Thought Content: Thought content normal.        Result Review :  The following data was reviewed by: Chicho De MD on 03/28/2025:    Data reviewed : prior office notes reviewed           Assessment and Plan   Diagnoses and all orders for this visit:    1. Attention deficit (Primary)  -     NeuroPsych Testing; Future  Discussed potential diagnoses, we will plan for neuropsych testing to further assess     2. Moderate persistent asthma with acute exacerbation  -     montelukast (SINGULAIR) 10 MG tablet; Take 1 tablet by mouth Every Night for 30 days.  Dispense: 30 tablet; Refill: 11  -     Fluticasone-Salmeterol (ADVAIR/WIXELA) 250-50 MCG/ACT DISKUS; Inhale 1 puff 2 (Two) Times a Day for 30 days.  Dispense: 60 each; Refill: 11    3. Moderate persistent allergic asthma  -     albuterol sulfate  (90 Base) MCG/ACT inhaler; Inhale 2 puffs 4 (Four) Times a Day As Needed for Wheezing or Shortness of Air.  Dispense: 8 g; Refill: 3  -     albuterol (PROVENTIL) (2.5 MG/3ML) 0.083% nebulizer solution; Take 2.5 mg by nebulization Every 4 (Four) Hours As Needed for Wheezing or Shortness of Air.  Dispense: 45 mL; Refill: 3    F/u once neuropsych testing completed    Chicho De MD  [facility]  [address] [place] [address]  Phone: [phone]

## 2025-06-03 DIAGNOSIS — J45.41 MODERATE PERSISTENT ASTHMA WITH ACUTE EXACERBATION: ICD-10-CM

## 2025-06-03 DIAGNOSIS — J45.40 MODERATE PERSISTENT ALLERGIC ASTHMA: Chronic | ICD-10-CM

## 2025-06-03 RX ORDER — ALBUTEROL SULFATE 90 UG/1
2 INHALANT RESPIRATORY (INHALATION) 4 TIMES DAILY PRN
Qty: 8 G | Refills: 3 | Status: SHIPPED | OUTPATIENT
Start: 2025-06-03

## 2025-06-03 RX ORDER — MONTELUKAST SODIUM 10 MG/1
10 TABLET ORAL NIGHTLY
Qty: 30 TABLET | Refills: 11 | Status: SHIPPED | OUTPATIENT
Start: 2025-06-03 | End: 2025-07-03

## 2025-06-03 RX ORDER — FLUTICASONE PROPIONATE AND SALMETEROL 250; 50 UG/1; UG/1
1 POWDER RESPIRATORY (INHALATION)
Qty: 60 EACH | Refills: 11 | Status: SHIPPED | OUTPATIENT
Start: 2025-06-03 | End: 2025-07-03

## 2025-06-18 ENCOUNTER — LAB (OUTPATIENT)
Dept: LAB | Facility: HOSPITAL | Age: 35
End: 2025-06-18
Payer: COMMERCIAL

## 2025-06-18 ENCOUNTER — TRANSCRIBE ORDERS (OUTPATIENT)
Dept: ADMINISTRATIVE | Facility: HOSPITAL | Age: 35
End: 2025-06-18
Payer: COMMERCIAL

## 2025-06-18 DIAGNOSIS — Z01.811 PRE-OP CHEST EXAM: Primary | ICD-10-CM

## 2025-06-18 DIAGNOSIS — Z01.811 PRE-OP CHEST EXAM: ICD-10-CM

## 2025-06-18 LAB
ALBUMIN SERPL-MCNC: 4.7 G/DL (ref 3.5–5.2)
ALBUMIN/GLOB SERPL: 1.8 G/DL
ALP SERPL-CCNC: 44 U/L (ref 39–117)
ALT SERPL W P-5'-P-CCNC: 8 U/L (ref 1–33)
ANION GAP SERPL CALCULATED.3IONS-SCNC: 13.9 MMOL/L (ref 5–15)
AST SERPL-CCNC: 16 U/L (ref 1–32)
BASOPHILS # BLD AUTO: 0.1 10*3/MM3 (ref 0–0.2)
BASOPHILS NFR BLD AUTO: 1.4 % (ref 0–1.5)
BILIRUB SERPL-MCNC: 0.4 MG/DL (ref 0–1.2)
BUN SERPL-MCNC: 7 MG/DL (ref 6–20)
BUN/CREAT SERPL: 7.8 (ref 7–25)
CALCIUM SPEC-SCNC: 9.4 MG/DL (ref 8.6–10.5)
CHLORIDE SERPL-SCNC: 104 MMOL/L (ref 98–107)
CO2 SERPL-SCNC: 23.1 MMOL/L (ref 22–29)
CREAT SERPL-MCNC: 0.9 MG/DL (ref 0.57–1)
DEPRECATED RDW RBC AUTO: 41.5 FL (ref 37–54)
EGFRCR SERPLBLD CKD-EPI 2021: 85.1 ML/MIN/1.73
EOSINOPHIL # BLD AUTO: 0.22 10*3/MM3 (ref 0–0.4)
EOSINOPHIL NFR BLD AUTO: 3 % (ref 0.3–6.2)
ERYTHROCYTE [DISTWIDTH] IN BLOOD BY AUTOMATED COUNT: 12.9 % (ref 12.3–15.4)
GLOBULIN UR ELPH-MCNC: 2.6 GM/DL
GLUCOSE SERPL-MCNC: 92 MG/DL (ref 65–99)
HCT VFR BLD AUTO: 43.3 % (ref 34–46.6)
HGB BLD-MCNC: 14.5 G/DL (ref 12–15.9)
IMM GRANULOCYTES # BLD AUTO: 0.02 10*3/MM3 (ref 0–0.05)
IMM GRANULOCYTES NFR BLD AUTO: 0.3 % (ref 0–0.5)
LYMPHOCYTES # BLD AUTO: 2.62 10*3/MM3 (ref 0.7–3.1)
LYMPHOCYTES NFR BLD AUTO: 35.6 % (ref 19.6–45.3)
MCH RBC QN AUTO: 29.8 PG (ref 26.6–33)
MCHC RBC AUTO-ENTMCNC: 33.5 G/DL (ref 31.5–35.7)
MCV RBC AUTO: 88.9 FL (ref 79–97)
MONOCYTES # BLD AUTO: 0.51 10*3/MM3 (ref 0.1–0.9)
MONOCYTES NFR BLD AUTO: 6.9 % (ref 5–12)
NEUTROPHILS NFR BLD AUTO: 3.88 10*3/MM3 (ref 1.7–7)
NEUTROPHILS NFR BLD AUTO: 52.8 % (ref 42.7–76)
NRBC BLD AUTO-RTO: 0 /100 WBC (ref 0–0.2)
PLATELET # BLD AUTO: 334 10*3/MM3 (ref 140–450)
PMV BLD AUTO: 9.3 FL (ref 6–12)
POTASSIUM SERPL-SCNC: 3.9 MMOL/L (ref 3.5–5.2)
PROT SERPL-MCNC: 7.3 G/DL (ref 6–8.5)
RBC # BLD AUTO: 4.87 10*6/MM3 (ref 3.77–5.28)
SODIUM SERPL-SCNC: 141 MMOL/L (ref 136–145)
WBC NRBC COR # BLD AUTO: 7.35 10*3/MM3 (ref 3.4–10.8)

## 2025-06-18 PROCEDURE — 36415 COLL VENOUS BLD VENIPUNCTURE: CPT

## 2025-06-18 PROCEDURE — 80053 COMPREHEN METABOLIC PANEL: CPT

## 2025-06-18 PROCEDURE — 85025 COMPLETE CBC W/AUTO DIFF WBC: CPT

## 2025-08-15 DIAGNOSIS — J45.41 MODERATE PERSISTENT ASTHMA WITH ACUTE EXACERBATION: ICD-10-CM

## 2025-08-15 RX ORDER — FLUTICASONE PROPIONATE AND SALMETEROL 250; 50 UG/1; UG/1
1 POWDER RESPIRATORY (INHALATION)
Qty: 60 EACH | Refills: 11 | Status: SHIPPED | OUTPATIENT
Start: 2025-08-15 | End: 2025-09-14

## (undated) DEVICE — SINGLE-USE BIOPSY FORCEPS: Brand: RADIAL JAW 4

## (undated) DEVICE — BITEBLOCK OMNI BLOC

## (undated) DEVICE — GOWN ISOL W/THUMB UNIV BLU BX/15

## (undated) DEVICE — MSK ENDO PORT O2 POM ELITE CURAPLEX A/

## (undated) DEVICE — GOWN ,SIRUS,NONREINFORCED 3XL: Brand: MEDLINE

## (undated) DEVICE — KT ORCA ORCAPOD DISP STRL

## (undated) DEVICE — CANN NASL CO2 TRULINK W/O2 A/

## (undated) DEVICE — VIAL FORMLN CAP 10PCT 20ML

## (undated) DEVICE — Device

## (undated) DEVICE — FLEX ADVANTAGE 1500CC: Brand: FLEX ADVANTAGE